# Patient Record
Sex: MALE | Race: WHITE | NOT HISPANIC OR LATINO | Employment: FULL TIME | URBAN - METROPOLITAN AREA
[De-identification: names, ages, dates, MRNs, and addresses within clinical notes are randomized per-mention and may not be internally consistent; named-entity substitution may affect disease eponyms.]

---

## 2017-03-22 ENCOUNTER — TELEPHONE (OUTPATIENT)
Dept: INTERNAL MEDICINE | Facility: CLINIC | Age: 52
End: 2017-03-22

## 2017-03-22 RX ORDER — OSELTAMIVIR PHOSPHATE 75 MG/1
75 CAPSULE ORAL DAILY
Qty: 10 CAPSULE | Refills: 0 | Status: SHIPPED | OUTPATIENT
Start: 2017-03-22 | End: 2017-04-01

## 2017-03-22 NOTE — TELEPHONE ENCOUNTER
----- Message from Camilla Mattson MA sent at 3/22/2017  8:36 AM EDT -----  Pt wife was diagnosed with flu. Patient wants to know if Tamiflu can be Rx.  [facility]  Contact patient [phone]

## 2017-09-01 ENCOUNTER — HOSPITAL ENCOUNTER (EMERGENCY)
Facility: HOSPITAL | Age: 52
Discharge: HOME | End: 2017-09-01
Attending: EMERGENCY MEDICINE | Admitting: EMERGENCY MEDICINE

## 2017-09-01 ENCOUNTER — APPOINTMENT (OUTPATIENT)
Dept: CT IMAGING | Facility: HOSPITAL | Age: 52
End: 2017-09-01

## 2017-09-01 VITALS
DIASTOLIC BLOOD PRESSURE: 58 MMHG | RESPIRATION RATE: 20 BRPM | HEIGHT: 69 IN | OXYGEN SATURATION: 99 % | BODY MASS INDEX: 30.96 KG/M2 | WEIGHT: 209 LBS | SYSTOLIC BLOOD PRESSURE: 123 MMHG | HEART RATE: 92 BPM | TEMPERATURE: 98.1 F
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
## 2017-09-01 DIAGNOSIS — S22.080A COMPRESSION FRACTURE OF T12 VERTEBRA, INITIAL ENCOUNTER (HCC): ICD-10-CM

## 2017-09-01 PROCEDURE — 25010000002 DEXAMETHASONE SOD PHOS-NACL 10-0.9 MG/50ML-% SOLUTION: Performed by: EMERGENCY MEDICINE

## 2017-09-01 PROCEDURE — 96374 THER/PROPH/DIAG INJ IV PUSH: CPT

## 2017-09-01 PROCEDURE — 96375 TX/PRO/DX INJ NEW DRUG ADDON: CPT

## 2017-09-01 PROCEDURE — 72131 CT LUMBAR SPINE W/O DYE: CPT

## 2017-09-01 PROCEDURE — 72128 CT CHEST SPINE W/O DYE: CPT

## 2017-09-01 PROCEDURE — 25010000002 HYDROMORPHONE PER 4 MG: Performed by: EMERGENCY MEDICINE

## 2017-09-01 PROCEDURE — 99284 EMERGENCY DEPT VISIT MOD MDM: CPT

## 2017-09-01 PROCEDURE — 25010000002 KETOROLAC TROMETHAMINE PER 15 MG: Performed by: EMERGENCY MEDICINE

## 2017-09-01 PROCEDURE — 96376 TX/PRO/DX INJ SAME DRUG ADON: CPT

## 2017-09-01 RX ORDER — DEXAMETHASONE IN 0.9 % SOD CHL 10 MG/50ML
10 INTRAVENOUS SOLUTION, PIGGYBACK (ML) INTRAVENOUS ONCE
Status: COMPLETED | OUTPATIENT
Start: 2017-09-01 | End: 2017-09-01

## 2017-09-01 RX ORDER — HYDROCODONE BITARTRATE AND ACETAMINOPHEN 5; 325 MG/1; MG/1
TABLET ORAL
Qty: 20 TABLET | Refills: 0 | Status: ON HOLD | OUTPATIENT
Start: 2017-09-01 | End: 2017-09-07

## 2017-09-01 RX ORDER — SODIUM CHLORIDE 0.9 % (FLUSH) 0.9 %
10 SYRINGE (ML) INJECTION AS NEEDED
Status: DISCONTINUED | OUTPATIENT
Start: 2017-09-01 | End: 2017-09-01 | Stop reason: HOSPADM

## 2017-09-01 RX ORDER — DEXAMETHASONE SODIUM PHOSPHATE 4 MG/ML
10 INJECTION, SOLUTION INTRA-ARTICULAR; INTRALESIONAL; INTRAMUSCULAR; INTRAVENOUS; SOFT TISSUE ONCE
Status: DISCONTINUED | OUTPATIENT
Start: 2017-09-01 | End: 2017-09-01 | Stop reason: SDUPTHER

## 2017-09-01 RX ORDER — KETOROLAC TROMETHAMINE 15 MG/ML
7.5 INJECTION, SOLUTION INTRAMUSCULAR; INTRAVENOUS ONCE
Status: COMPLETED | OUTPATIENT
Start: 2017-09-01 | End: 2017-09-01

## 2017-09-01 RX ORDER — HYDROMORPHONE HYDROCHLORIDE 1 MG/ML
0.5 INJECTION, SOLUTION INTRAMUSCULAR; INTRAVENOUS; SUBCUTANEOUS
Status: DISCONTINUED | OUTPATIENT
Start: 2017-09-01 | End: 2017-09-01 | Stop reason: HOSPADM

## 2017-09-01 RX ORDER — HYDROMORPHONE HYDROCHLORIDE 1 MG/ML
0.5 INJECTION, SOLUTION INTRAMUSCULAR; INTRAVENOUS; SUBCUTANEOUS ONCE
Status: COMPLETED | OUTPATIENT
Start: 2017-09-01 | End: 2017-09-01

## 2017-09-01 RX ADMIN — KETOROLAC TROMETHAMINE 7.5 MG: 15 INJECTION, SOLUTION INTRAMUSCULAR; INTRAVENOUS at 18:26

## 2017-09-01 RX ADMIN — HYDROMORPHONE HYDROCHLORIDE 0.5 MG: 1 INJECTION, SOLUTION INTRAMUSCULAR; INTRAVENOUS; SUBCUTANEOUS at 18:58

## 2017-09-01 RX ADMIN — HYDROMORPHONE HYDROCHLORIDE 0.5 MG: 1 INJECTION, SOLUTION INTRAMUSCULAR; INTRAVENOUS; SUBCUTANEOUS at 20:37

## 2017-09-01 RX ADMIN — HYDROMORPHONE HYDROCHLORIDE 0.5 MG: 1 INJECTION, SOLUTION INTRAMUSCULAR; INTRAVENOUS; SUBCUTANEOUS at 18:27

## 2017-09-01 RX ADMIN — Medication 10 MG: at 20:39
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
## 2017-09-02 NOTE — DISCHARGE INSTRUCTIONS
Avoid bending or stooping.  Should either try to lay down or stand up.          CONTROLLED SUBSTANCE(S) EDUCATION  Controlled Substances have been prescribed by your provider to treat your medical condition and associated symptoms. Although Controlled Substances can be effective in relieving your pain or other symptoms, they may also cause serious adverse effects. It is important that you understand how to safely and appropriately take these medications.  Proper Use  1. Carefully following instructions for use, including timing of doses, whether to take the  medication with or without food, and any foods or other medications to avoid while taking the medication;  2. If you have low or impaired vision you should wear glasses when taking the medication and not take the medication in the dark;  3. You should read the prescription container label each time to confirm the dosage;  4. You should never use the medication after the expiration date;  5. You must never share the medication with others;  6. You must not take the medication with alcohol or other sedatives;  7. You should not take the medication to help you sleep;  8. You should never break, crush or chew the medication;  9. If you have been prescribed a skin patch (transdermal), external heat, fever and exertion can increase the absorption of these products, leading to potentially fatal overdose;  10. You should immediately contact the physician?s office to report any adverse reaction and,  11. It is illegal to share, sell or give away Controlled Substances.  Driving and Work Safety  1. Controlled Substances may cause sleepiness, clouded thinking, decreased concentration, slower reflexes, or incoordination, all of which may create a danger to you and others when driving or operating certain type of machinery;  2. Avoid, if possible, driving or engaging in other potentially dangerous work or other activities, for a specific period of time until the initial  effects of the Controlled Substances no longer create such dangers; and,  3. Ingesting other substances, such as alcohol, benzodiazepines or some cold remedies, at the same time you are taking the Controlled Substances prescribed or dispensed may increase cognitive and motor impairment.  Pregnancy  If you are pregnant or nursing a baby, avoid using Controlled Substances, or use them on a minimal basis in strict accordance with your provider?s instructions.  Potential for Overdose and Response  1. The use of Controlled Substances creates a risk of respiratory depression, which may result in serious harm or death. You and others should be watchful for the following warning signs of overmedication:  ? intoxicated behavior, such as confusion, slurred speech, or stumbling;  ? feeling dizzy or faint;  ? acting very drowsy or groggy;  ? unusual snoring, gasping, or snorting during sleep;  ? and/or difficulty waking up from sleep or difficulty in staying awake.  2. Immediately call ?911? or an emergency service upon you or your caregivers observing or experiencing any of the following conditions:  ? you cannot be aroused or waken, or are unable to talk after being awakened;  ? you have shortness of breath, slow or light breathing, or stopped breathing;  ? gurgling noises coming from your mouth or throat;  ? your body is limp, seems lifeless;  ? your face is pale or clammy;  ? your fingernails or lips are turning purple or blue; and/or  ? your heartbeat is slow, unusual or stopped  Safe Storage of Controlled Substances  1. If your Controlled Substances are not stored in a safe manner there is a potential that  partners, family members or others may improperly obtain your Controlled Substances;  2. Always keep the Controlled Substances in the original container;  3. Store Controlled Substances in a locked cabinet or other secure storage unit, that is cool, dry and out of direct sunlight, such as:  ? an existing safe;  ? a  cut-proof travel bag;  ? a portable lock box designed for travel; or,  ? a locking medical box.  4. Do not store Controlled Substances in:  ? an unlocked medicine cabinet;  ? in your car; or,  ? in a refrigerator or freezer unless specifically recommended by the prescriber or  pharmacist; and  5. Immediately notify your provider if any Controlled Substances prescribed or dispensed by the provider are stolen or improperly taken by another individual.  Proper Disposal  1. It is important to safely and appropriately dispose of unused Controlled Substances that had been prescribed or dispensed by your provider;  2. Promptly dispose of unused Controlled Substances after the expiration date of the  prescription or after you no longer require the Controlled Substances to treat your medical condition;  3. In order to safely dispose of Controlled Substances, you should turn in the unused Controlled Substances as part of an approved governmental drug take-back program. The [place] Office of Drug Control Policy has a listing of [place] Permanent Drug Disposal Locations at http://www.odcp.[place].gov - click on the [place] Prescriptions Drug Drop Map and Location on the left side of the page.  4. You should not flush Controlled Substances down the toilet; and,  5. You should personally remove any identifying information, including the prescription number, from an empty Controlled Substance container and then properly dispose of the empty container.  CONSENT FOR TREATMENT WITH CONTROLLED SUBSTANCE(S)  (This is for an initial prescription)  1. Controlled Substances  Controlled Substances are prescribed to treat a variety of conditions, including the relief  of chronic pain, to provide stimulation, promote weight loss, and treat mood disorders.  Pain relief is an important medical reason to take Controlled Substances.  Controlled Substances are drugs or chemical substances whose possession and use are  regulated under the Controlled  Substances Act. The law requires that patient are informed of the risks, benefits, and alternatives of taking Controlled Substances.  2. Adverse Effects  As with any medication, there are risks and adverse effects associated with the use of  Controlled Substances. Common adverse effects of pain medicines could include, but are not limited to: sedation or sleepiness, nausea, vomiting, constipation, pruritus (itching), confusion, respiratory depression, and urinary retention. Some of these effects may make it unsafe for you to drive a vehicle, operate heavy machinery, or perform other tasks that require concentration and coordination. Excessive use of these Controlled Substances can lead to profound sedation, respiratory depression, coma, and/or death. Regarding stimulants, adverse effects could include, but are not limited to: drug dependency, neuropsychiatric symptoms such as psychosis and manuel, weight loss, cardiovascular events such as heart attack and stroke, insomnia, hypertension, and agitation. Any questions you have regarding the Controlled Substance(s) should be discussed with the prescribing provider.  3. Physical Dependence, Tolerance, and Addiction  Although uncommon when used for their clinical indications, both pain relievers and  stimulants can cause physical dependence, tolerance, and/or addiction when used for a  prolonged period. Maintenance therapy with these Controlled Substances can cause  physical dependence. This means that if these medications are abruptly stopped, or  decreased significantly over a short period of time, a patient may experience withdrawal  symptoms such as: nervousness, irritability, insomnia, sweating, abdominal cramping,  nausea, vomiting, and diarrhea. Tolerance occurs when the effects of these Controlled  Substances are decreased over a period of prolonged use making it necessary to increase the dosage. Physical dependence and tolerance are different than addiction.  Addiction is a complex disease characterized by compulsive craving or seeking and use of a substance despite its extreme negatives on a person. The risk of addiction may be increased in a patient with a history of alcoholism or other addiction.  4. Alternatives  Controlled Substances are routinely prescribed to treat moderate to severe pain or other  medical conditions. Other medicines are available to treat these conditions that are not  associated with tolerance or addiction, however, are associated with a lower level of pain  relief or stimulation. It may also be an alternative to not take any medicine to treat these  conditions, or to use alternative modalities, other than medicine to treat these conditions.  I voluntarily consent to the receipt of the above-named Controlled Substance(s) as prescribed by my provider. I have been informed of the benefits, risks, and alternatives to taking these medications. I acknowledge that I have read and understood all of the information above and I have had the opportunity to ask questions and have them answered to my satisfaction.

## 2017-09-05 ENCOUNTER — PREP FOR SURGERY (OUTPATIENT)
Dept: OTHER | Facility: HOSPITAL | Age: 52
End: 2017-09-05

## 2017-09-05 ENCOUNTER — OFFICE VISIT (OUTPATIENT)
Dept: NEUROSURGERY | Facility: CLINIC | Age: 52
End: 2017-09-05

## 2017-09-05 VITALS — BODY MASS INDEX: 31.1 KG/M2 | TEMPERATURE: 96 F | HEIGHT: 69 IN | WEIGHT: 210 LBS

## 2017-09-05 DIAGNOSIS — S22.081A: Primary | ICD-10-CM

## 2017-09-05 DIAGNOSIS — S22.081A BURST FRACTURE OF TWELFTH THORACIC VERTEBRA (HCC): Primary | ICD-10-CM

## 2017-09-05 DIAGNOSIS — M54.9 MECHANICAL BACK PAIN: ICD-10-CM

## 2017-09-05 PROCEDURE — 99244 OFF/OP CNSLTJ NEW/EST MOD 40: CPT | Performed by: NEUROLOGICAL SURGERY

## 2017-09-05 RX ORDER — HYDROCODONE BITARTRATE AND ACETAMINOPHEN 7.5; 325 MG/1; MG/1
1 TABLET ORAL 2 TIMES DAILY PRN
Qty: 60 TABLET | Refills: 0 | Status: ON HOLD | OUTPATIENT
Start: 2017-09-05 | End: 2017-09-08

## 2017-09-05 RX ORDER — CHLORHEXIDINE GLUCONATE 4 G/100ML
SOLUTION TOPICAL
Qty: 120 ML | Refills: 0 | Status: ON HOLD | OUTPATIENT
Start: 2017-09-05 | End: 2017-09-07

## 2017-09-05 RX ORDER — OXYCODONE HCL 10 MG/1
10 TABLET, FILM COATED, EXTENDED RELEASE ORAL ONCE
Status: CANCELLED | OUTPATIENT
Start: 2017-09-05 | End: 2017-09-05

## 2017-09-05 RX ORDER — ACETAMINOPHEN 500 MG
1000 TABLET ORAL ONCE
Status: CANCELLED | OUTPATIENT
Start: 2017-09-05 | End: 2017-09-05

## 2017-09-05 RX ORDER — FAMOTIDINE 20 MG/1
20 TABLET, FILM COATED ORAL
Status: CANCELLED | OUTPATIENT
Start: 2017-09-05

## 2017-09-05 RX ORDER — CEFAZOLIN SODIUM 2 G/100ML
2 INJECTION, SOLUTION INTRAVENOUS ONCE
Status: CANCELLED | OUTPATIENT
Start: 2017-09-05 | End: 2017-09-05

## 2017-09-05 RX ORDER — SODIUM CHLORIDE 0.9 % (FLUSH) 0.9 %
1-10 SYRINGE (ML) INJECTION AS NEEDED
Status: CANCELLED | OUTPATIENT
Start: 2017-09-05

## 2017-09-05 RX ORDER — IBUPROFEN 800 MG/1
800 TABLET ORAL ONCE
Status: CANCELLED | OUTPATIENT
Start: 2017-09-05 | End: 2017-09-05
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
## 2017-09-06 ENCOUNTER — APPOINTMENT (OUTPATIENT)
Dept: PREADMISSION TESTING | Facility: HOSPITAL | Age: 52
End: 2017-09-06

## 2017-09-06 VITALS — HEIGHT: 69 IN | BODY MASS INDEX: 30.96 KG/M2 | WEIGHT: 209 LBS

## 2017-09-06 DIAGNOSIS — S22.081A BURST FRACTURE OF TWELFTH THORACIC VERTEBRA (HCC): ICD-10-CM

## 2017-09-06 LAB
DEPRECATED RDW RBC AUTO: 40.2 FL (ref 37–54)
ERYTHROCYTE [DISTWIDTH] IN BLOOD BY AUTOMATED COUNT: 13 % (ref 11.3–14.5)
HCT VFR BLD AUTO: 45.6 % (ref 38.9–50.9)
HGB BLD-MCNC: 15.7 G/DL (ref 13.1–17.5)
MCH RBC QN AUTO: 29.3 PG (ref 27–31)
MCHC RBC AUTO-ENTMCNC: 34.4 G/DL (ref 32–36)
MCV RBC AUTO: 85.2 FL (ref 80–99)
MRSA DNA SPEC QL NAA+PROBE: POSITIVE
PLATELET # BLD AUTO: 213 10*3/MM3 (ref 150–450)
PMV BLD AUTO: 8.6 FL (ref 6–12)
RBC # BLD AUTO: 5.35 10*6/MM3 (ref 4.2–5.76)
WBC NRBC COR # BLD: 8.39 10*3/MM3 (ref 3.5–10.8)

## 2017-09-06 PROCEDURE — 87641 MR-STAPH DNA AMP PROBE: CPT | Performed by: NEUROLOGICAL SURGERY

## 2017-09-06 PROCEDURE — 85027 COMPLETE CBC AUTOMATED: CPT | Performed by: ANESTHESIOLOGY

## 2017-09-06 PROCEDURE — 36415 COLL VENOUS BLD VENIPUNCTURE: CPT

## 2017-09-06 PROCEDURE — 93010 ELECTROCARDIOGRAM REPORT: CPT | Performed by: INTERNAL MEDICINE

## 2017-09-06 PROCEDURE — 93005 ELECTROCARDIOGRAM TRACING: CPT

## 2017-09-07 ENCOUNTER — HOSPITAL ENCOUNTER (INPATIENT)
Facility: HOSPITAL | Age: 52
LOS: 1 days | Discharge: HOME | End: 2017-09-08
Attending: NEUROLOGICAL SURGERY | Admitting: NEUROLOGICAL SURGERY

## 2017-09-07 ENCOUNTER — ANESTHESIA (OUTPATIENT)
Dept: PERIOP | Facility: HOSPITAL | Age: 52
End: 2017-09-07

## 2017-09-07 ENCOUNTER — APPOINTMENT (OUTPATIENT)
Dept: GENERAL RADIOLOGY | Facility: HOSPITAL | Age: 52
End: 2017-09-07

## 2017-09-07 ENCOUNTER — ANESTHESIA EVENT (OUTPATIENT)
Dept: PERIOP | Facility: HOSPITAL | Age: 52
End: 2017-09-07

## 2017-09-07 DIAGNOSIS — S22.081A BURST FRACTURE OF TWELFTH THORACIC VERTEBRA (HCC): ICD-10-CM

## 2017-09-07 PROCEDURE — 25010000002 ONDANSETRON PER 1 MG: Performed by: NURSE ANESTHETIST, CERTIFIED REGISTERED

## 2017-09-07 PROCEDURE — 25010000002 DEXAMETHASONE PER 1 MG: Performed by: NURSE ANESTHETIST, CERTIFIED REGISTERED

## 2017-09-07 PROCEDURE — 94660 CPAP INITIATION&MGMT: CPT

## 2017-09-07 PROCEDURE — 25010000002 VANCOMYCIN PER 500 MG: Performed by: NEUROLOGICAL SURGERY

## 2017-09-07 PROCEDURE — C1713 ANCHOR/SCREW BN/BN,TIS/BN: HCPCS | Performed by: NEUROLOGICAL SURGERY

## 2017-09-07 PROCEDURE — 25010000002 HYDROMORPHONE PER 4 MG: Performed by: NURSE ANESTHETIST, CERTIFIED REGISTERED

## 2017-09-07 PROCEDURE — 25010000002 FENTANYL CITRATE (PF) 100 MCG/2ML SOLUTION: Performed by: NURSE ANESTHETIST, CERTIFIED REGISTERED

## 2017-09-07 PROCEDURE — 25010000002 PROPOFOL 10 MG/ML EMULSION: Performed by: NURSE ANESTHETIST, CERTIFIED REGISTERED

## 2017-09-07 PROCEDURE — 76001 HC FLUORO GREATER THAN 1 HOUR: CPT

## 2017-09-07 PROCEDURE — 22842 INSERT SPINE FIXATION DEVICE: CPT | Performed by: NEUROLOGICAL SURGERY

## 2017-09-07 PROCEDURE — 25010000002 NEOSTIGMINE 10 MG/10ML SOLUTION: Performed by: NURSE ANESTHETIST, CERTIFIED REGISTERED

## 2017-09-07 PROCEDURE — 25010000002 VANCOMYCIN: Performed by: PHYSICIAN ASSISTANT

## 2017-09-07 PROCEDURE — 22610 ARTHRD PST TQ 1NTRSPC THRC: CPT | Performed by: NEUROLOGICAL SURGERY

## 2017-09-07 PROCEDURE — 22614 ARTHRD PST TQ 1NTRSPC EA ADD: CPT | Performed by: NEUROLOGICAL SURGERY

## 2017-09-07 PROCEDURE — 0PH404Z INSERTION OF INTERNAL FIXATION DEVICE INTO THORACIC VERTEBRA, OPEN APPROACH: ICD-10-PCS | Performed by: NEUROLOGICAL SURGERY

## 2017-09-07 PROCEDURE — 76000 FLUOROSCOPY <1 HR PHYS/QHP: CPT

## 2017-09-07 PROCEDURE — 61783 SCAN PROC SPINAL: CPT | Performed by: NEUROLOGICAL SURGERY

## 2017-09-07 DEVICE — SCREW 55840016545 5.5 CNMAS 6.5X45 CC
Type: IMPLANTABLE DEVICE | Status: FUNCTIONAL
Brand: CD HORIZON® SPINAL SYSTEM

## 2017-09-07 DEVICE — SCREW 55840016540 5.5 CNMAS 6.5X40 CC
Type: IMPLANTABLE DEVICE | Status: FUNCTIONAL
Brand: CD HORIZON® SPINAL SYSTEM

## 2017-09-07 DEVICE — ROD 1555200500 5.5MM CCM LINED 500MM
Type: IMPLANTABLE DEVICE | Status: FUNCTIONAL
Brand: CD HORIZON® SPINAL SYSTEM

## 2017-09-07 DEVICE — SET SCREW 5540030 5.5 TI NS BRK OFF
Type: IMPLANTABLE DEVICE | Status: FUNCTIONAL
Brand: CD HORIZON® SPINAL SYSTEM

## 2017-09-07 RX ORDER — BISACODYL 5 MG/1
5 TABLET, DELAYED RELEASE ORAL DAILY PRN
Status: DISCONTINUED | OUTPATIENT
Start: 2017-09-07 | End: 2017-09-08 | Stop reason: HOSPADM

## 2017-09-07 RX ORDER — FENTANYL CITRATE 50 UG/ML
50 INJECTION, SOLUTION INTRAMUSCULAR; INTRAVENOUS
Status: DISCONTINUED | OUTPATIENT
Start: 2017-09-07 | End: 2017-09-07 | Stop reason: HOSPADM

## 2017-09-07 RX ORDER — MORPHINE SULFATE 4 MG/ML
4 INJECTION, SOLUTION INTRAMUSCULAR; INTRAVENOUS EVERY 4 HOURS PRN
Status: DISCONTINUED | OUTPATIENT
Start: 2017-09-07 | End: 2017-09-08 | Stop reason: HOSPADM

## 2017-09-07 RX ORDER — BISACODYL 10 MG
10 SUPPOSITORY, RECTAL RECTAL DAILY PRN
Status: DISCONTINUED | OUTPATIENT
Start: 2017-09-07 | End: 2017-09-08 | Stop reason: HOSPADM

## 2017-09-07 RX ORDER — OXYCODONE HYDROCHLORIDE AND ACETAMINOPHEN 5; 325 MG/1; MG/1
1 TABLET ORAL EVERY 6 HOURS PRN
Status: DISCONTINUED | OUTPATIENT
Start: 2017-09-07 | End: 2017-09-08 | Stop reason: HOSPADM

## 2017-09-07 RX ORDER — DIAZEPAM 5 MG/1
5 TABLET ORAL EVERY 6 HOURS
Status: DISCONTINUED | OUTPATIENT
Start: 2017-09-08 | End: 2017-09-08 | Stop reason: HOSPADM

## 2017-09-07 RX ORDER — ONDANSETRON 2 MG/ML
4 INJECTION INTRAMUSCULAR; INTRAVENOUS EVERY 6 HOURS PRN
Status: DISCONTINUED | OUTPATIENT
Start: 2017-09-07 | End: 2017-09-08 | Stop reason: HOSPADM

## 2017-09-07 RX ORDER — SODIUM CHLORIDE 0.9 % (FLUSH) 0.9 %
1-10 SYRINGE (ML) INJECTION AS NEEDED
Status: DISCONTINUED | OUTPATIENT
Start: 2017-09-07 | End: 2017-09-07 | Stop reason: HOSPADM

## 2017-09-07 RX ORDER — FENTANYL CITRATE 50 UG/ML
INJECTION, SOLUTION INTRAMUSCULAR; INTRAVENOUS AS NEEDED
Status: DISCONTINUED | OUTPATIENT
Start: 2017-09-07 | End: 2017-09-07 | Stop reason: SURG

## 2017-09-07 RX ORDER — IBUPROFEN 600 MG/1
600 TABLET ORAL EVERY 8 HOURS
Status: DISCONTINUED | OUTPATIENT
Start: 2017-09-07 | End: 2017-09-08 | Stop reason: HOSPADM

## 2017-09-07 RX ORDER — NEOSTIGMINE METHYLSULFATE 1 MG/ML
INJECTION, SOLUTION INTRAVENOUS AS NEEDED
Status: DISCONTINUED | OUTPATIENT
Start: 2017-09-07 | End: 2017-09-07 | Stop reason: SURG

## 2017-09-07 RX ORDER — NALOXONE HCL 0.4 MG/ML
0.4 VIAL (ML) INJECTION AS NEEDED
Status: DISCONTINUED | OUTPATIENT
Start: 2017-09-07 | End: 2017-09-07 | Stop reason: HOSPADM

## 2017-09-07 RX ORDER — SODIUM CHLORIDE 0.9 % (FLUSH) 0.9 %
1-10 SYRINGE (ML) INJECTION AS NEEDED
Status: DISCONTINUED | OUTPATIENT
Start: 2017-09-07 | End: 2017-09-08 | Stop reason: HOSPADM

## 2017-09-07 RX ORDER — NALOXONE HCL 0.4 MG/ML
0.4 VIAL (ML) INJECTION
Status: DISCONTINUED | OUTPATIENT
Start: 2017-09-07 | End: 2017-09-08 | Stop reason: HOSPADM

## 2017-09-07 RX ORDER — ACETAMINOPHEN 500 MG
1000 TABLET ORAL ONCE
Status: COMPLETED | OUTPATIENT
Start: 2017-09-07 | End: 2017-09-07

## 2017-09-07 RX ORDER — LABETALOL HYDROCHLORIDE 5 MG/ML
5 INJECTION, SOLUTION INTRAVENOUS
Status: DISCONTINUED | OUTPATIENT
Start: 2017-09-07 | End: 2017-09-07 | Stop reason: HOSPADM

## 2017-09-07 RX ORDER — SODIUM CHLORIDE, SODIUM LACTATE, POTASSIUM CHLORIDE, CALCIUM CHLORIDE 600; 310; 30; 20 MG/100ML; MG/100ML; MG/100ML; MG/100ML
100 INJECTION, SOLUTION INTRAVENOUS CONTINUOUS
Status: DISCONTINUED | OUTPATIENT
Start: 2017-09-07 | End: 2017-09-08

## 2017-09-07 RX ORDER — FAMOTIDINE 10 MG/ML
20 INJECTION, SOLUTION INTRAVENOUS ONCE
Status: CANCELLED | OUTPATIENT
Start: 2017-09-07 | End: 2017-09-07

## 2017-09-07 RX ORDER — HYDROCODONE BITARTRATE AND ACETAMINOPHEN 7.5; 325 MG/1; MG/1
1 TABLET ORAL EVERY 4 HOURS PRN
Status: DISCONTINUED | OUTPATIENT
Start: 2017-09-07 | End: 2017-09-08 | Stop reason: HOSPADM

## 2017-09-07 RX ORDER — ONDANSETRON 2 MG/ML
4 INJECTION INTRAMUSCULAR; INTRAVENOUS ONCE AS NEEDED
Status: DISCONTINUED | OUTPATIENT
Start: 2017-09-07 | End: 2017-09-07 | Stop reason: HOSPADM

## 2017-09-07 RX ORDER — DEXAMETHASONE SODIUM PHOSPHATE 4 MG/ML
INJECTION, SOLUTION INTRA-ARTICULAR; INTRALESIONAL; INTRAMUSCULAR; INTRAVENOUS; SOFT TISSUE AS NEEDED
Status: DISCONTINUED | OUTPATIENT
Start: 2017-09-07 | End: 2017-09-07 | Stop reason: SURG

## 2017-09-07 RX ORDER — ROCURONIUM BROMIDE 10 MG/ML
INJECTION, SOLUTION INTRAVENOUS AS NEEDED
Status: DISCONTINUED | OUTPATIENT
Start: 2017-09-07 | End: 2017-09-07 | Stop reason: SURG

## 2017-09-07 RX ORDER — MEPERIDINE HYDROCHLORIDE 25 MG/ML
12.5 INJECTION INTRAMUSCULAR; INTRAVENOUS; SUBCUTANEOUS
Status: DISCONTINUED | OUTPATIENT
Start: 2017-09-07 | End: 2017-09-07 | Stop reason: HOSPADM

## 2017-09-07 RX ORDER — PROPOFOL 10 MG/ML
VIAL (ML) INTRAVENOUS AS NEEDED
Status: DISCONTINUED | OUTPATIENT
Start: 2017-09-07 | End: 2017-09-07 | Stop reason: SURG

## 2017-09-07 RX ORDER — EPHEDRINE SULFATE 50 MG/ML
5 INJECTION, SOLUTION INTRAVENOUS ONCE AS NEEDED
Status: DISCONTINUED | OUTPATIENT
Start: 2017-09-07 | End: 2017-09-07 | Stop reason: HOSPADM

## 2017-09-07 RX ORDER — LIDOCAINE HYDROCHLORIDE 10 MG/ML
0.5 INJECTION, SOLUTION EPIDURAL; INFILTRATION; INTRACAUDAL; PERINEURAL ONCE AS NEEDED
Status: COMPLETED | OUTPATIENT
Start: 2017-09-07 | End: 2017-09-07

## 2017-09-07 RX ORDER — ONDANSETRON 2 MG/ML
INJECTION INTRAMUSCULAR; INTRAVENOUS AS NEEDED
Status: DISCONTINUED | OUTPATIENT
Start: 2017-09-07 | End: 2017-09-07 | Stop reason: SURG

## 2017-09-07 RX ORDER — DOCUSATE SODIUM 100 MG/1
100 CAPSULE, LIQUID FILLED ORAL 2 TIMES DAILY PRN
Status: DISCONTINUED | OUTPATIENT
Start: 2017-09-07 | End: 2017-09-08 | Stop reason: HOSPADM

## 2017-09-07 RX ORDER — ACETAMINOPHEN 325 MG/1
650 TABLET ORAL EVERY 4 HOURS PRN
Status: DISCONTINUED | OUTPATIENT
Start: 2017-09-07 | End: 2017-09-08 | Stop reason: HOSPADM

## 2017-09-07 RX ORDER — IBUPROFEN 800 MG/1
800 TABLET ORAL ONCE
Status: COMPLETED | OUTPATIENT
Start: 2017-09-07 | End: 2017-09-07

## 2017-09-07 RX ORDER — SODIUM CHLORIDE, SODIUM LACTATE, POTASSIUM CHLORIDE, CALCIUM CHLORIDE 600; 310; 30; 20 MG/100ML; MG/100ML; MG/100ML; MG/100ML
9 INJECTION, SOLUTION INTRAVENOUS CONTINUOUS
Status: DISCONTINUED | OUTPATIENT
Start: 2017-09-07 | End: 2017-09-07 | Stop reason: SDUPTHER

## 2017-09-07 RX ORDER — OXYCODONE HYDROCHLORIDE 15 MG/1
15 TABLET, FILM COATED, EXTENDED RELEASE ORAL EVERY 12 HOURS SCHEDULED
Status: DISCONTINUED | OUTPATIENT
Start: 2017-09-07 | End: 2017-09-08 | Stop reason: HOSPADM

## 2017-09-07 RX ORDER — MAGNESIUM HYDROXIDE 1200 MG/15ML
LIQUID ORAL AS NEEDED
Status: DISCONTINUED | OUTPATIENT
Start: 2017-09-07 | End: 2017-09-07 | Stop reason: HOSPADM

## 2017-09-07 RX ORDER — GLYCOPYRROLATE 0.2 MG/ML
INJECTION INTRAMUSCULAR; INTRAVENOUS AS NEEDED
Status: DISCONTINUED | OUTPATIENT
Start: 2017-09-07 | End: 2017-09-07 | Stop reason: SURG

## 2017-09-07 RX ORDER — SODIUM CHLORIDE, SODIUM LACTATE, POTASSIUM CHLORIDE, CALCIUM CHLORIDE 600; 310; 30; 20 MG/100ML; MG/100ML; MG/100ML; MG/100ML
90 INJECTION, SOLUTION INTRAVENOUS CONTINUOUS
Status: DISCONTINUED | OUTPATIENT
Start: 2017-09-07 | End: 2017-09-07 | Stop reason: SDUPTHER

## 2017-09-07 RX ORDER — SENNA AND DOCUSATE SODIUM 50; 8.6 MG/1; MG/1
1 TABLET, FILM COATED ORAL NIGHTLY PRN
Status: DISCONTINUED | OUTPATIENT
Start: 2017-09-07 | End: 2017-09-08 | Stop reason: HOSPADM

## 2017-09-07 RX ORDER — BUPIVACAINE HYDROCHLORIDE AND EPINEPHRINE 2.5; 5 MG/ML; UG/ML
INJECTION, SOLUTION EPIDURAL; INFILTRATION; INTRACAUDAL; PERINEURAL AS NEEDED
Status: DISCONTINUED | OUTPATIENT
Start: 2017-09-07 | End: 2017-09-07 | Stop reason: HOSPADM

## 2017-09-07 RX ORDER — OXYCODONE HCL 10 MG/1
10 TABLET, FILM COATED, EXTENDED RELEASE ORAL ONCE
Status: COMPLETED | OUTPATIENT
Start: 2017-09-07 | End: 2017-09-07

## 2017-09-07 RX ORDER — DIPHENHYDRAMINE HCL 25 MG
25 CAPSULE ORAL NIGHTLY PRN
Status: DISCONTINUED | OUTPATIENT
Start: 2017-09-07 | End: 2017-09-08 | Stop reason: HOSPADM

## 2017-09-07 RX ORDER — LIDOCAINE HYDROCHLORIDE 10 MG/ML
INJECTION, SOLUTION EPIDURAL; INFILTRATION; INTRACAUDAL; PERINEURAL AS NEEDED
Status: DISCONTINUED | OUTPATIENT
Start: 2017-09-07 | End: 2017-09-07 | Stop reason: SURG

## 2017-09-07 RX ORDER — OXYCODONE HYDROCHLORIDE AND ACETAMINOPHEN 5; 325 MG/1; MG/1
1 TABLET ORAL ONCE AS NEEDED
Status: DISCONTINUED | OUTPATIENT
Start: 2017-09-07 | End: 2017-09-07 | Stop reason: HOSPADM

## 2017-09-07 RX ORDER — FAMOTIDINE 20 MG/1
20 TABLET, FILM COATED ORAL ONCE
Status: CANCELLED | OUTPATIENT
Start: 2017-09-07 | End: 2017-09-07

## 2017-09-07 RX ORDER — FAMOTIDINE 20 MG/1
20 TABLET, FILM COATED ORAL
Status: COMPLETED | OUTPATIENT
Start: 2017-09-07 | End: 2017-09-07

## 2017-09-07 RX ORDER — MORPHINE SULFATE 2 MG/ML
2 INJECTION, SOLUTION INTRAMUSCULAR; INTRAVENOUS EVERY 4 HOURS PRN
Status: DISCONTINUED | OUTPATIENT
Start: 2017-09-07 | End: 2017-09-08 | Stop reason: HOSPADM

## 2017-09-07 RX ORDER — HYDROMORPHONE HYDROCHLORIDE 1 MG/ML
0.5 INJECTION, SOLUTION INTRAMUSCULAR; INTRAVENOUS; SUBCUTANEOUS
Status: DISCONTINUED | OUTPATIENT
Start: 2017-09-07 | End: 2017-09-07 | Stop reason: HOSPADM

## 2017-09-07 RX ADMIN — LIDOCAINE HYDROCHLORIDE 0.5 ML: 10 INJECTION, SOLUTION EPIDURAL; INFILTRATION; INTRACAUDAL; PERINEURAL at 11:05

## 2017-09-07 RX ADMIN — OXYCODONE HYDROCHLORIDE 15 MG: 15 TABLET, FILM COATED, EXTENDED RELEASE ORAL at 21:21

## 2017-09-07 RX ADMIN — IBUPROFEN 800 MG: 800 TABLET, FILM COATED ORAL at 11:05

## 2017-09-07 RX ADMIN — IBUPROFEN 600 MG: 600 TABLET, FILM COATED ORAL at 21:21

## 2017-09-07 RX ADMIN — DEXAMETHASONE SODIUM PHOSPHATE 8 MG: 4 INJECTION, SOLUTION INTRAMUSCULAR; INTRAVENOUS at 13:20

## 2017-09-07 RX ADMIN — FENTANYL CITRATE 50 MCG: 50 INJECTION, SOLUTION INTRAMUSCULAR; INTRAVENOUS at 15:42

## 2017-09-07 RX ADMIN — HYDROMORPHONE HYDROCHLORIDE 0.5 MG: 1 INJECTION, SOLUTION INTRAMUSCULAR; INTRAVENOUS; SUBCUTANEOUS at 15:20

## 2017-09-07 RX ADMIN — FENTANYL CITRATE 100 MCG: 50 INJECTION, SOLUTION INTRAMUSCULAR; INTRAVENOUS at 13:20

## 2017-09-07 RX ADMIN — DIAZEPAM 5 MG: 5 TABLET ORAL at 23:19

## 2017-09-07 RX ADMIN — ONDANSETRON 4 MG: 2 INJECTION INTRAMUSCULAR; INTRAVENOUS at 14:40

## 2017-09-07 RX ADMIN — FENTANYL CITRATE 50 MCG: 50 INJECTION, SOLUTION INTRAMUSCULAR; INTRAVENOUS at 15:58

## 2017-09-07 RX ADMIN — NEOSTIGMINE METHYLSULFATE 3 MG: 1 INJECTION, SOLUTION INTRAVENOUS at 15:00

## 2017-09-07 RX ADMIN — SODIUM CHLORIDE, POTASSIUM CHLORIDE, SODIUM LACTATE AND CALCIUM CHLORIDE: 600; 310; 30; 20 INJECTION, SOLUTION INTRAVENOUS at 14:00

## 2017-09-07 RX ADMIN — ROCURONIUM BROMIDE 50 MG: 10 INJECTION INTRAVENOUS at 13:20

## 2017-09-07 RX ADMIN — LIDOCAINE HYDROCHLORIDE 40 MG: 10 INJECTION, SOLUTION EPIDURAL; INFILTRATION; INTRACAUDAL; PERINEURAL at 13:20

## 2017-09-07 RX ADMIN — VANCOMYCIN HYDROCHLORIDE 1500 MG: 1 INJECTION, POWDER, LYOPHILIZED, FOR SOLUTION INTRAVENOUS at 12:49

## 2017-09-07 RX ADMIN — FAMOTIDINE 20 MG: 20 TABLET ORAL at 11:05

## 2017-09-07 RX ADMIN — PROPOFOL 200 MG: 10 INJECTION, EMULSION INTRAVENOUS at 13:20

## 2017-09-07 RX ADMIN — HYDROCODONE BITARTRATE AND ACETAMINOPHEN 1 TABLET: 7.5; 325 TABLET ORAL at 18:42

## 2017-09-07 RX ADMIN — SODIUM CHLORIDE, POTASSIUM CHLORIDE, SODIUM LACTATE AND CALCIUM CHLORIDE 9 ML/HR: 600; 310; 30; 20 INJECTION, SOLUTION INTRAVENOUS at 11:05

## 2017-09-07 RX ADMIN — FENTANYL CITRATE 100 MCG: 50 INJECTION, SOLUTION INTRAMUSCULAR; INTRAVENOUS at 14:55

## 2017-09-07 RX ADMIN — GLYCOPYRROLATE 0.4 MG: 0.2 INJECTION, SOLUTION INTRAMUSCULAR; INTRAVENOUS at 15:00

## 2017-09-07 RX ADMIN — HYDROMORPHONE HYDROCHLORIDE 0.5 MG: 1 INJECTION, SOLUTION INTRAMUSCULAR; INTRAVENOUS; SUBCUTANEOUS at 15:27

## 2017-09-07 RX ADMIN — ACETAMINOPHEN 1000 MG: 500 TABLET ORAL at 11:05

## 2017-09-07 RX ADMIN — OXYCODONE HYDROCHLORIDE 10 MG: 10 TABLET, FILM COATED, EXTENDED RELEASE ORAL at 11:05

## 2017-09-07 NOTE — ANESTHESIA POSTPROCEDURE EVALUATION
Patient: Vickey Dunaway    Procedure Summary     Date Anesthesia Start Anesthesia Stop Room / Location    09/07/17 1315  [facility]       Procedure Diagnosis Surgeon Provider    Nonsegmental pedicle screw fixation T11-L1 without fusion  (N/A Spine Lumbar) Burst fracture of twelfth thoracic vertebra  (Burst fracture of twelfth thoracic vertebra [S22.081A]) MD Bulmaro Cain MD          Anesthesia Type: general  Last vitals  BP   140/86 (09/07/17 1515)    Temp   98.7 °F (37.1 °C) (09/07/17 1515)    Pulse   82 (09/07/17 1515)   Resp   17 (09/07/17 1515)    SpO2   95 % (09/07/17 1515)      Post Anesthesia Care and Evaluation    Patient location during evaluation: PACU  Patient participation: complete - patient participated  Level of consciousness: awake and alert  Pain score: 4  Pain management: adequate  Airway patency: patent  Anesthetic complications: No anesthetic complications  PONV Status: none  Cardiovascular status: hemodynamically stable and acceptable  Respiratory status: nonlabored ventilation, acceptable and nasal cannula  Hydration status: acceptable
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
## 2017-09-07 NOTE — ANESTHESIA PROCEDURE NOTES
Airway  Urgency: elective    Airway not difficult    General Information and Staff    Patient location during procedure: OR  Anesthesiologist: TRACEE WILLOUGHBY  CRNA: ATIYA GARCIA    Indications and Patient Condition  Indications for airway management: airway protection    Preoxygenated: yes  MILS not maintained throughout  Mask difficulty assessment: 1 - vent by mask    Final Airway Details  Final airway type: endotracheal airway      Successful airway: ETT  Cuffed: yes   Successful intubation technique: direct laryngoscopy  Endotracheal tube insertion site: oral  Blade: Vega  Blade size: #2  ETT size: 7.5 mm  Cormack-Lehane Classification: grade I - full view of glottis  Placement verified by: chest auscultation and capnometry   Cuff volume (mL): 5  Measured from: lips  ETT to lips (cm): 22  Number of attempts at approach: 1    Additional Comments  Negative epigastric sounds, Breath sound equal bilaterally with symmetric chest rise and fall
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
## 2017-09-07 NOTE — INTERVAL H&P NOTE
H&P reviewed. The patient was examined and there are no changes to the H&P.     /87 (BP Location: Right arm, Patient Position: Lying)  Pulse 79  Temp 98 °F (36.7 °C) (Temporal Artery )   Resp 20  SpO2 96%      Heart: RRR  Lungs: CTAB    Immunizations:    Tetanus: No     Influenza: No     Pneumo: No    Labs were reviewed.       Results for LEAH ABBOTT (MRN [id number]) as of 9/7/2017 11:43   Ref. Range 9/6/2017 12:12   WBC Latest Ref Range: 3.50 - 10.80 10*3/mm3 8.39   RBC Latest Ref Range: 4.20 - 5.76 10*6/mm3 5.35   Hemoglobin Latest Ref Range: 13.1 - 17.5 g/dL 15.7   Hematocrit Latest Ref Range: 38.9 - 50.9 % 45.6   RDW Latest Ref Range: 11.3 - 14.5 % 13.0   MCV Latest Ref Range: 80.0 - 99.0 fL 85.2   MCH Latest Ref Range: 27.0 - 31.0 pg 29.3   MCHC Latest Ref Range: 32.0 - 36.0 g/dL 34.4   MPV Latest Ref Range: 6.0 - 12.0 fL 8.6   Platelets Latest Ref Range: 150 - 450 10*3/mm3 213   RDW-SD Latest Ref Range: 37.0 - 54.0 fl 40.2       EKG: NSR    Plan: Nonsegmental pedicle screw fixation T11-L1 without fusion

## 2017-09-08 VITALS
WEIGHT: 208 LBS | RESPIRATION RATE: 16 BRPM | SYSTOLIC BLOOD PRESSURE: 109 MMHG | BODY MASS INDEX: 30.81 KG/M2 | OXYGEN SATURATION: 92 % | HEIGHT: 69 IN | TEMPERATURE: 97.5 F | DIASTOLIC BLOOD PRESSURE: 70 MMHG | HEART RATE: 70 BPM

## 2017-09-08 DIAGNOSIS — Z98.1 STATUS POST LUMBAR SPINAL FUSION: Primary | ICD-10-CM

## 2017-09-08 PROCEDURE — 99024 POSTOP FOLLOW-UP VISIT: CPT | Performed by: PHYSICIAN ASSISTANT

## 2017-09-08 PROCEDURE — 25010000002 VANCOMYCIN 10 G RECONSTITUTED SOLUTION: Performed by: NEUROLOGICAL SURGERY

## 2017-09-08 RX ORDER — OXYCODONE HYDROCHLORIDE 15 MG/1
15 TABLET, FILM COATED, EXTENDED RELEASE ORAL EVERY 12 HOURS SCHEDULED
Qty: 14 TABLET | Refills: 0 | Status: SHIPPED | OUTPATIENT
Start: 2017-09-08 | End: 2017-10-03

## 2017-09-08 RX ORDER — HYDROCODONE BITARTRATE AND ACETAMINOPHEN 7.5; 325 MG/1; MG/1
1 TABLET ORAL EVERY 6 HOURS PRN
Qty: 60 TABLET | Refills: 0 | Status: SHIPPED | OUTPATIENT
Start: 2017-09-08 | End: 2019-02-20

## 2017-09-08 RX ORDER — TIZANIDINE 4 MG/1
4 TABLET ORAL EVERY 12 HOURS PRN
Status: DISCONTINUED | OUTPATIENT
Start: 2017-09-08 | End: 2017-09-08 | Stop reason: HOSPADM

## 2017-09-08 RX ORDER — IBUPROFEN 600 MG/1
600 TABLET ORAL EVERY 8 HOURS
Qty: 60 TABLET | Refills: 0 | Status: SHIPPED | OUTPATIENT
Start: 2017-09-08 | End: 2020-08-28

## 2017-09-08 RX ORDER — TIZANIDINE 4 MG/1
4 TABLET ORAL EVERY 12 HOURS PRN
Qty: 60 TABLET | Refills: 1 | Status: SHIPPED | OUTPATIENT
Start: 2017-09-08 | End: 2019-02-20

## 2017-09-08 RX ADMIN — IBUPROFEN 600 MG: 600 TABLET, FILM COATED ORAL at 14:52

## 2017-09-08 RX ADMIN — VANCOMYCIN HYDROCHLORIDE 1500 MG: 10 INJECTION, POWDER, LYOPHILIZED, FOR SOLUTION INTRAVENOUS at 00:20

## 2017-09-08 RX ADMIN — SODIUM CHLORIDE, POTASSIUM CHLORIDE, SODIUM LACTATE AND CALCIUM CHLORIDE 100 ML/HR: 600; 310; 30; 20 INJECTION, SOLUTION INTRAVENOUS at 05:43

## 2017-09-08 RX ADMIN — Medication 1 TABLET: at 04:17

## 2017-09-08 RX ADMIN — MAGNESIUM HYDROXIDE 10 ML: 2400 SUSPENSION ORAL at 16:25

## 2017-09-08 RX ADMIN — OXYCODONE HYDROCHLORIDE 15 MG: 15 TABLET, FILM COATED, EXTENDED RELEASE ORAL at 08:49

## 2017-09-08 RX ADMIN — HYDROCODONE BITARTRATE AND ACETAMINOPHEN 1 TABLET: 7.5; 325 TABLET ORAL at 14:52

## 2017-09-08 RX ADMIN — IBUPROFEN 600 MG: 600 TABLET, FILM COATED ORAL at 05:43

## 2017-09-08 RX ADMIN — DIAZEPAM 5 MG: 5 TABLET ORAL at 12:14

## 2017-09-08 RX ADMIN — OXYCODONE AND ACETAMINOPHEN 1 TABLET: 5; 325 TABLET ORAL at 04:17

## 2017-09-08 NOTE — PROGRESS NOTES
"NEUROSURGERY PROGRESS NOTE     LOS: 1 day   Patient Care Team:  Fernando Booth MD as PCP - General (Internal Medicine)  Fernando Booth MD as Referring Physician (Internal Medicine)    Chief Complaint: T12 burst fracture    POD#: 1 Day Post-Op  Procedures:  T11-L1 pedicle screw stabilization    Interval History:   The patient is ambulating and voiding independently.  Patient Complaints: Incisional pain.  This pain seems to be a bit different than the preoperative pain.  Patient Denies: Weakness, numbness, or voiding difficulty.    Vital Signs: Blood pressure 106/67, pulse 76, temperature 99.9 °F (37.7 °C), temperature source Temporal Artery , resp. rate 16, height 69\" (175.3 cm), weight 208 lb (94.3 kg), SpO2 94 %.  Intake/Output:   Intake/Output Summary (Last 24 hours) at 09/08/17 0641  Last data filed at 09/08/17 0600   Gross per 24 hour   Intake             1300 ml   Output              205 ml   Net             1095 ml     Drain output: 35/70 mL    Physical Exam:  The patient is awake, alert, and recently comfortable.  Motor and sensory function are intact in his lower extremities.  Dry dressing is in place on his incision.     Assessment/Plan:  1.  T12 burst fracture status post T11-S1 posterior stabilization.  2.  History of MRSA.  3.  Disposition: Drain out after lunch.  Home this afternoon.  The patient is to wear his TLSO whenever out of bed for sustained periods of time except when bathing.      Grayson Batista MD  09/08/17  6:41 AM    "

## 2017-09-08 NOTE — PLAN OF CARE
Problem: Patient Care Overview (Adult)  Goal: Plan of Care Review  Outcome: Ongoing (interventions implemented as appropriate)    09/08/17 5956   Patient Care Overview   Progress progress toward functional goals as expected   Coping/Psychosocial Response Interventions   Plan Of Care Reviewed With patient   Outcome Evaluation   Outcome Summary/Follow up Plan Pt appears to have slept off and on during most of the 7p shift. Pain controlled with PO meds. IVFs infusing. Ambulates to BR with gait belt and assist x 1. VSS         Problem: Perioperative Period (Adult)  Goal: Signs and Symptoms of Listed Potential Problems Will be Absent or Manageable (Perioperative Period)  Outcome: Ongoing (interventions implemented as appropriate)    Problem: Pain, Acute (Adult)  Goal: Identify Related Risk Factors and Signs and Symptoms  Outcome: Ongoing (interventions implemented as appropriate)  Goal: Acceptable Pain Control/Comfort Level  Outcome: Ongoing (interventions implemented as appropriate)

## 2017-09-08 NOTE — PROGRESS NOTES
Discharge Planning Assessment  [facility]     Patient Name: Vickey Dunaway  MRN: [id number]  Today's Date: 9/8/2017    Admit Date: 9/7/2017          Discharge Needs Assessment       09/08/17 1131    Living Environment    Lives With spouse;child(allyson), dependent    Living Arrangements house    Home Accessibility bed and bath are not on the first floor;stairs (2 railings present)    Transportation Available car;family or friend will provide    Living Environment    Quality Of Family Relationships supportive;involved;helpful    Able to Return to Prior Living Arrangements yes    Living Arrangement Comments Mr. Dunaway lives with his wife, Toyin, and 2 children, in a two story home in [place].  Mr. Dunaway stated that his brother in law and parents will also be assisting him at home as needed.    Discharge Needs Assessment    Concerns To Be Addressed no discharge needs identified    Readmission Within The Last 30 Days no previous admission in last 30 days    Equipment Currently Used at Home bipap/ cpap    Equipment Needed After Discharge none    Discharge Disposition home or self-care    Discharge Contact Information if Applicable WifeToyin,  [phone]            Discharge Plan       09/08/17 1133    Case Management/Social Work Plan    Plan Home with family assistance    Patient/Family In Agreement With Plan yes    Additional Comments Met with Mr. Dunaway at the bedside to discuss discharge planning.  Mr. Dunaway is independent with his ADL's and is ambulating independently.  No DME or home health services in the home.  He will have sufficient assistance at home from his brother in law, who is will be staying at his home through the weekend.  Mr. Hummel's parents will also be staying with him after the weekend to assist.  Mr. Dunaway is being transported home by his brother in law by car.  No discharge needs identified. CM will continue to follow.        Discharge Placement     No information found         Expected Discharge Date and Time     Expected Discharge Date Expected Discharge Time    Sep 8, 2017               Demographic Summary       09/08/17 1129    Referral Information    Admission Type inpatient    Arrived From home or self-care    Reason For Consult discharge planning    Record Reviewed clinical discipline documentation;history and physical;medical record    Contact Information    Permission Granted to Share Information With     Primary Care Physician Information    Name Fernando Hutchinson MD            Functional Status       09/08/17 1129    Functional Status Prior    Ambulation 0-->independent    Transferring 0-->independent    Toileting 0-->independent    Bathing 0-->independent    Dressing 0-->independent    Eating 0-->independent    Communication 3-->unable to understand (not related to language barrier)    Swallowing 0-->swallows foods/liquids without difficulty    IADL    Medications independent    Meal Preparation independent    Housekeeping independent    Laundry independent    Shopping independent    Activity Tolerance    Current Activity Limitations none    Usual Activity Tolerance good    Cognitive/Perceptual/Developmental    Current Mental Status/Cognitive Functioning no deficits noted    Recent Changes in Mental Status/Cognitive Functioning no changes    Employment/Financial    Employment/Finance Comments Mr. Dunaway has prescription drug coverage with [facility] insurance.  Verified insurance with patient.  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            Psychosocial     None            Abuse/Neglect     None            Legal       09/08/17 1131    Legal    Legal Comments No advance directives.            Substance Abuse     None            Patient Forms     None          Glenna Morales

## 2017-09-12 ENCOUNTER — DOCUMENTATION (OUTPATIENT)
Dept: NEUROSURGERY | Facility: CLINIC | Age: 52
End: 2017-09-12
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
## 2017-10-03 ENCOUNTER — OFFICE VISIT (OUTPATIENT)
Dept: NEUROSURGERY | Facility: CLINIC | Age: 52
End: 2017-10-03

## 2017-10-03 ENCOUNTER — HOSPITAL ENCOUNTER (OUTPATIENT)
Dept: GENERAL RADIOLOGY | Facility: HOSPITAL | Age: 52
Discharge: HOME | End: 2017-10-03
Admitting: PHYSICIAN ASSISTANT

## 2017-10-03 VITALS
SYSTOLIC BLOOD PRESSURE: 112 MMHG | WEIGHT: 202 LBS | HEART RATE: 99 BPM | TEMPERATURE: 97.6 F | BODY MASS INDEX: 29.92 KG/M2 | DIASTOLIC BLOOD PRESSURE: 70 MMHG | HEIGHT: 69 IN

## 2017-10-03 DIAGNOSIS — Z98.890 S/P SPINAL SURGERY: ICD-10-CM

## 2017-10-03 DIAGNOSIS — S22.081A BURST FRACTURE OF TWELFTH THORACIC VERTEBRA (HCC): Primary | ICD-10-CM

## 2017-10-03 DIAGNOSIS — Z98.1 STATUS POST LUMBAR SPINAL FUSION: ICD-10-CM

## 2017-10-03 PROCEDURE — 99024 POSTOP FOLLOW-UP VISIT: CPT | Performed by: PHYSICIAN ASSISTANT

## 2017-10-03 PROCEDURE — 72100 X-RAY EXAM L-S SPINE 2/3 VWS: CPT
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
## 2017-10-03 NOTE — PROGRESS NOTES
[facility]    Patient: Vickey Dunaway      Date: 10/03/17  : 1965   MRN: [id number]    PCP: Fernando Booth MD  ______________________________________________________________________    SURGERY:  ***     ***  ______________________________________________________________________    CHIEF COMPLIANT:   ***    HISTORY OF PRESENT ILLNESS  Mr. Dunaway is a pleasant 51 y.o. ***    who presents today for postop follow up.     He has undergone previous lumbar interventions***      Pre-operatively, he presented with ***     Today, he reports ***     No fevers, chills, headaches, wound drainage, B/B changes, saddle anesthesia, new pain, or weakness.    The patient's past medical history, past surgical history, family history, and social history have been reviewed at length in the electronic medical record.      Past Medical History:   Diagnosis Date   • Sleep apnea with use of continuous positive airway pressure (CPAP)      Past Surgical History:   Procedure Laterality Date   • COLONOSCOPY     • LUMBAR LAMINECTOMY WITH FUSION N/A 2017    Procedure: Nonsegmental pedicle screw fixation T11-L1 without fusion ;  Surgeon: Grayson Batista MD;  Location: [facility];  Service:    • NASAL SEPTUM SURGERY       Social History     Social History   • Marital status:      Spouse name: N/A   • Number of children: N/A   • Years of education: N/A     Occupational History   • Not on file.     Social History Main Topics   • Smoking status: Former Smoker     Types: Electronic Cigarette   • Smokeless tobacco: Never Used   • Alcohol use Yes      Comment: social   • Drug use: No   • Sexual activity: Yes     Partners: Female      Comment: spouse     Other Topics Concern   • Not on file     Social History Narrative    Lives with his wife     Mr. Dunaway has a current medication list which includes the following prescription(s): hydrocodone-acetaminophen, ibuprofen, and  "tizanidine. See EMR for details.  He has No Known Allergies.      Review of Systems   Respiratory: Positive for apnea.    Musculoskeletal: Positive for back pain.   All other systems reviewed and are negative.       PHYSICAL EXAM      Vitals: /70  Pulse 99  Temp 97.6 °F (36.4 °C)  Ht 69\" (175.3 cm)  Wt 202 lb (91.6 kg)  BMI 29.83 kg/m2   General:   WDWN, alert, and cooperative. NAD.   HEENT: NCAT   Chest: Breathing unlabored.   Neck: Normal ROM.  No masses or tenderness.    Back: Normal ROM.  No masses or tenderness.    Extremities: No edema or deformities.   Psych: Appropriate mood and affect.   Higher Fx: AAOx3. Speech intact.  Recent and remote memory intact.   Motor: Normal muscle strength, bulk and tone in upper and lower extremities.    No fasciculations, rigidity, spasticity, or abnormal movements.    Coordination: Coordination is intact.  No pronator drift.   Sensation: Sensation is intact to light touch testing throughout.   Reflexes: 2+ and symmetric in the upper and lower extremities.   Station & Gait: Station and gait are normal.   Special Test: No Ocasio's signs, there is no ankle clonus.  Straight leg raise is negative.   Antonio's sign is negative.     Skin: *** incision is well approximated at appropriate stage of healing without warmth, drainage, or erythema.      Medical Decision Making  Data Review:    *** fusion?  demonstrates intact hardware with no evidence of misalignment.  TONY reviewed at today's visit.     ASSESSMENT  No diagnosis found.    DISCUSSION  Mr. Dunaway ***  His incision is healing well with no s/s of infection.   He should continue to advance activity as tolerated, limit repetitive bending, lifting, and twisting of the lower back, and limit vigorous activities.   Reviewed imaging and clinical findings with Dr. Batista.   No Follow-up on file. He should call or RTC sooner if symptoms worsen or new symptoms develop.     There are no Patient Instructions on file for this " visit.  No orders of the defined types were placed in this encounter.    No orders of the defined types were placed in this encounter.      Time:   *** minutes with more than 50% of time spent counseling and coordination of care with patient.  Discussed impressions, prognosis, workup, risk/benefits of treatment options with patient, and risk factor reductions today. ***    JORGE Starr MD  [facility]  10/03/17

## 2017-10-03 NOTE — PATIENT INSTRUCTIONS
Ambulate frequently at home.  May sit as tolerated.  Increase activity as tolerated.    No lifting greater than 10 pounds for next few weeks.  May start to drive short distances. Advance as tolerated.  Wear his brace whenever out of bed for sustained periods of time except when bathing.    Wound Care:  Clean with mild soap and rinse at least 2 times daily.    Call our office any odorous drainage, fever, chills, or increasing pain.      Plan is to return to work in 4-6 weeks if ready at that time.  May call and will provide work note or follow up in needed.    Healthy back booklet provided today.

## 2017-10-16 ENCOUNTER — TELEPHONE (OUTPATIENT)
Dept: NEUROSURGERY | Facility: CLINIC | Age: 52
End: 2017-10-16

## 2017-10-16 NOTE — TELEPHONE ENCOUNTER
Called patient and left  about benadryl cream and to try to find out more information about work requirements that he has so I can discuss them with Georgie tomorrow.    Asked him to call us back and left the office phone number

## 2017-10-16 NOTE — TELEPHONE ENCOUNTER
Provider:  Lester  Caller: Vickey Dunaway  Time of call:   10:12  Phone #:  [phone]   Surgery:  Nonsegmental pedicle screw fixation T11-L1 without fusion   Surgery Date:  09/07/17  Last visit:   10/03/17  Next visit: 11/17/17    Reason for call:       Please see Lea's message below, pt has rash around bandage

## 2017-10-16 NOTE — TELEPHONE ENCOUNTER
Patient underwent pedicle screw fixation at T11-L1 without fusion on 9/7/17. He has been wearing a bandage over the incision and has what he described as a rash around the incision. What can he use on this? Please call to advise.    He also requested a return to work letter. I will provide that and email it to him per his request.

## 2017-11-17 ENCOUNTER — OFFICE VISIT (OUTPATIENT)
Dept: NEUROSURGERY | Facility: CLINIC | Age: 52
End: 2017-11-17

## 2017-11-17 VITALS — TEMPERATURE: 98.3 F | WEIGHT: 208 LBS | HEIGHT: 69 IN | BODY MASS INDEX: 30.81 KG/M2

## 2017-11-17 DIAGNOSIS — Z98.890 S/P SPINAL SURGERY: Primary | ICD-10-CM

## 2017-11-17 DIAGNOSIS — M54.9 MECHANICAL BACK PAIN: ICD-10-CM

## 2017-11-17 PROCEDURE — 99024 POSTOP FOLLOW-UP VISIT: CPT | Performed by: NEUROLOGICAL SURGERY
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
## 2018-01-11 ENCOUNTER — HOSPITAL ENCOUNTER (OUTPATIENT)
Dept: GENERAL RADIOLOGY | Facility: HOSPITAL | Age: 53
Discharge: HOME | End: 2018-01-11
Attending: NEUROLOGICAL SURGERY | Admitting: NEUROLOGICAL SURGERY

## 2018-01-11 DIAGNOSIS — Z98.890 S/P SPINAL SURGERY: ICD-10-CM

## 2018-01-11 PROCEDURE — 72100 X-RAY EXAM L-S SPINE 2/3 VWS: CPT

## 2018-01-17 ENCOUNTER — OFFICE VISIT (OUTPATIENT)
Dept: NEUROSURGERY | Facility: CLINIC | Age: 53
End: 2018-01-17

## 2018-01-17 VITALS — BODY MASS INDEX: 30.96 KG/M2 | WEIGHT: 209 LBS | HEIGHT: 69 IN | TEMPERATURE: 97.7 F

## 2018-01-17 DIAGNOSIS — S22.081A BURST FRACTURE OF T12 VERTEBRA (HCC): ICD-10-CM

## 2018-01-17 DIAGNOSIS — M54.9 MECHANICAL BACK PAIN: Primary | ICD-10-CM

## 2018-01-17 PROCEDURE — 99213 OFFICE O/P EST LOW 20 MIN: CPT | Performed by: NEUROLOGICAL SURGERY
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
## 2018-05-24 ENCOUNTER — HOSPITAL ENCOUNTER (OUTPATIENT)
Dept: GENERAL RADIOLOGY | Facility: HOSPITAL | Age: 53
Discharge: HOME | End: 2018-05-24
Attending: NEUROLOGICAL SURGERY | Admitting: NEUROLOGICAL SURGERY

## 2018-05-24 DIAGNOSIS — S22.081A BURST FRACTURE OF T12 VERTEBRA (HCC): ICD-10-CM

## 2018-05-24 PROCEDURE — 72100 X-RAY EXAM L-S SPINE 2/3 VWS: CPT

## 2018-06-01 ENCOUNTER — OFFICE VISIT (OUTPATIENT)
Dept: NEUROSURGERY | Facility: CLINIC | Age: 53
End: 2018-06-01

## 2018-06-01 VITALS — TEMPERATURE: 97.7 F | WEIGHT: 197 LBS | BODY MASS INDEX: 29.18 KG/M2 | HEIGHT: 69 IN

## 2018-06-01 DIAGNOSIS — S22.081A BURST FRACTURE OF T12 VERTEBRA (HCC): ICD-10-CM

## 2018-06-01 DIAGNOSIS — Z98.1 HISTORY OF LUMBAR FUSION: ICD-10-CM

## 2018-06-01 DIAGNOSIS — M54.9 MECHANICAL BACK PAIN: Primary | ICD-10-CM

## 2018-06-01 PROCEDURE — 99213 OFFICE O/P EST LOW 20 MIN: CPT | Performed by: NEUROLOGICAL SURGERY
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
## 2019-01-21 ENCOUNTER — TELEPHONE (OUTPATIENT)
Dept: INTERNAL MEDICINE | Facility: CLINIC | Age: 54
End: 2019-01-21

## 2019-01-21 DIAGNOSIS — Z20.828 EXPOSURE TO THE FLU: Primary | ICD-10-CM

## 2019-01-21 RX ORDER — OSELTAMIVIR PHOSPHATE 75 MG/1
75 CAPSULE ORAL DAILY
Qty: 10 CAPSULE | Refills: 0 | Status: SHIPPED | OUTPATIENT
Start: 2019-01-21 | End: 2019-01-31

## 2019-02-20 ENCOUNTER — OFFICE VISIT (OUTPATIENT)
Dept: INTERNAL MEDICINE | Facility: CLINIC | Age: 54
End: 2019-02-20

## 2019-02-20 VITALS
SYSTOLIC BLOOD PRESSURE: 112 MMHG | WEIGHT: 207 LBS | HEART RATE: 76 BPM | DIASTOLIC BLOOD PRESSURE: 68 MMHG | RESPIRATION RATE: 18 BRPM | TEMPERATURE: 98.9 F | BODY MASS INDEX: 31.37 KG/M2 | HEIGHT: 68 IN

## 2019-02-20 DIAGNOSIS — Z23 IMMUNIZATION DUE: ICD-10-CM

## 2019-02-20 DIAGNOSIS — K21.9 GASTROESOPHAGEAL REFLUX DISEASE WITHOUT ESOPHAGITIS: ICD-10-CM

## 2019-02-20 DIAGNOSIS — Z12.5 PROSTATE CANCER SCREENING: ICD-10-CM

## 2019-02-20 DIAGNOSIS — Z12.11 SCREEN FOR COLON CANCER: ICD-10-CM

## 2019-02-20 DIAGNOSIS — Z00.00 PHYSICAL EXAM: Primary | ICD-10-CM

## 2019-02-20 LAB
ALBUMIN SERPL-MCNC: 4.54 G/DL (ref 3.2–4.8)
ALBUMIN/GLOB SERPL: 2.2 G/DL (ref 1.5–2.5)
ALP SERPL-CCNC: 52 U/L (ref 25–100)
ALT SERPL W P-5'-P-CCNC: 41 U/L (ref 7–40)
ANION GAP SERPL CALCULATED.3IONS-SCNC: 7 MMOL/L (ref 3–11)
ARTICHOKE IGE QN: 117 MG/DL (ref 0–130)
AST SERPL-CCNC: 29 U/L (ref 0–33)
BASOPHILS # BLD AUTO: 0.08 10*3/MM3 (ref 0–0.2)
BASOPHILS NFR BLD AUTO: 1.2 % (ref 0–1)
BILIRUB BLD-MCNC: NEGATIVE MG/DL
BILIRUB SERPL-MCNC: 0.5 MG/DL (ref 0.3–1.2)
BUN BLD-MCNC: 19 MG/DL (ref 9–23)
BUN/CREAT SERPL: 16.1 (ref 7–25)
CALCIUM SPEC-SCNC: 9.4 MG/DL (ref 8.7–10.4)
CHLORIDE SERPL-SCNC: 104 MMOL/L (ref 99–109)
CHOLEST SERPL-MCNC: 190 MG/DL (ref 0–200)
CLARITY, POC: CLEAR
CO2 SERPL-SCNC: 29 MMOL/L (ref 20–31)
COLOR UR: YELLOW
CREAT BLD-MCNC: 1.18 MG/DL (ref 0.6–1.3)
DEPRECATED RDW RBC AUTO: 41.9 FL (ref 37–54)
EOSINOPHIL # BLD AUTO: 0.13 10*3/MM3 (ref 0–0.3)
EOSINOPHIL NFR BLD AUTO: 1.9 % (ref 0–3)
ERYTHROCYTE [DISTWIDTH] IN BLOOD BY AUTOMATED COUNT: 13.3 % (ref 11.3–14.5)
GFR SERPL CREATININE-BSD FRML MDRD: 65 ML/MIN/1.73
GLOBULIN UR ELPH-MCNC: 2.1 GM/DL
GLUCOSE BLD-MCNC: 100 MG/DL (ref 70–100)
GLUCOSE UR STRIP-MCNC: NEGATIVE MG/DL
HCT VFR BLD AUTO: 50.5 % (ref 38.9–50.9)
HDLC SERPL-MCNC: 40 MG/DL (ref 40–60)
HGB BLD-MCNC: 16.7 G/DL (ref 13.1–17.5)
IMM GRANULOCYTES # BLD AUTO: 0.01 10*3/MM3 (ref 0–0.05)
IMM GRANULOCYTES NFR BLD AUTO: 0.1 % (ref 0–0.6)
KETONES UR QL: NEGATIVE
LEUKOCYTE EST, POC: NEGATIVE
LYMPHOCYTES # BLD AUTO: 1.67 10*3/MM3 (ref 0.6–4.8)
LYMPHOCYTES NFR BLD AUTO: 24.7 % (ref 24–44)
MCH RBC QN AUTO: 28.8 PG (ref 27–31)
MCHC RBC AUTO-ENTMCNC: 33.1 G/DL (ref 32–36)
MCV RBC AUTO: 87.1 FL (ref 80–99)
MONOCYTES # BLD AUTO: 0.53 10*3/MM3 (ref 0–1)
MONOCYTES NFR BLD AUTO: 7.8 % (ref 0–12)
NEUTROPHILS # BLD AUTO: 4.36 10*3/MM3 (ref 1.5–8.3)
NEUTROPHILS NFR BLD AUTO: 64.4 % (ref 41–71)
NITRITE UR-MCNC: NEGATIVE MG/ML
PH UR: 7 [PH] (ref 5–8)
PLATELET # BLD AUTO: 220 10*3/MM3 (ref 150–450)
PMV BLD AUTO: 9.3 FL (ref 6–12)
POTASSIUM BLD-SCNC: 4 MMOL/L (ref 3.5–5.5)
PROT SERPL-MCNC: 6.6 G/DL (ref 5.7–8.2)
PROT UR STRIP-MCNC: NEGATIVE MG/DL
PSA SERPL-MCNC: 0.71 NG/ML (ref 0–4)
RBC # BLD AUTO: 5.8 10*6/MM3 (ref 4.2–5.76)
RBC # UR STRIP: NEGATIVE /UL
SODIUM BLD-SCNC: 140 MMOL/L (ref 132–146)
SP GR UR: 1.02 (ref 1–1.03)
TRIGL SERPL-MCNC: 264 MG/DL (ref 0–150)
TSH SERPL DL<=0.05 MIU/L-ACNC: 1.72 MIU/ML (ref 0.35–5.35)
UROBILINOGEN UR QL: NORMAL
WBC NRBC COR # BLD: 6.77 10*3/MM3 (ref 3.5–10.8)

## 2019-02-20 PROCEDURE — 80053 COMPREHEN METABOLIC PANEL: CPT | Performed by: INTERNAL MEDICINE

## 2019-02-20 PROCEDURE — 90715 TDAP VACCINE 7 YRS/> IM: CPT | Performed by: INTERNAL MEDICINE

## 2019-02-20 PROCEDURE — 99386 PREV VISIT NEW AGE 40-64: CPT | Performed by: INTERNAL MEDICINE

## 2019-02-20 PROCEDURE — 81003 URINALYSIS AUTO W/O SCOPE: CPT | Performed by: INTERNAL MEDICINE

## 2019-02-20 PROCEDURE — 90471 IMMUNIZATION ADMIN: CPT | Performed by: INTERNAL MEDICINE

## 2019-02-20 PROCEDURE — 80061 LIPID PANEL: CPT | Performed by: INTERNAL MEDICINE

## 2019-02-20 PROCEDURE — 84443 ASSAY THYROID STIM HORMONE: CPT | Performed by: INTERNAL MEDICINE

## 2019-02-20 PROCEDURE — G0103 PSA SCREENING: HCPCS | Performed by: INTERNAL MEDICINE

## 2019-02-20 PROCEDURE — 85025 COMPLETE CBC W/AUTO DIFF WBC: CPT | Performed by: INTERNAL MEDICINE

## 2019-02-20 PROCEDURE — 36415 COLL VENOUS BLD VENIPUNCTURE: CPT | Performed by: INTERNAL MEDICINE

## 2019-02-20 RX ORDER — PANTOPRAZOLE SODIUM 40 MG/1
40 TABLET, DELAYED RELEASE ORAL DAILY
Qty: 30 TABLET | Refills: 5 | Status: SHIPPED | OUTPATIENT
Start: 2019-02-20 | End: 2019-09-27 | Stop reason: SDUPTHER

## 2019-02-20 NOTE — PROGRESS NOTES
Chief Complaint   Patient presents with   • RE-ESTABLISH CARE   • Annual Exam       History of Present Illness    The patient presents for a new patient physical examination and health maintenance visit.    The patient presents for an acute visit for GERD. The patient is not on medication for his gastroesophageal reflux. He reports belching and heartburn but he denies abdominal pain, chest pain, diarrhea, dysphagia, early satiety, hoarseness, nausea, odynophagia, rectal bleeding, vomiting or weight loss. The GERD is aggravated by lying down.    Review of Systems    CONSTITUTIONAL- Denies Fever, Chills, Sweats, Fatigue, Weakness or Malaise.    NECK- Denies Decreased Range of Motion, Stiffness, Thyroid Nodules, Enlarged Lymph Nodes or Goiter.    EYES- Denies Eye Pain, Eye Drainage, Eye Redness, Eye Discharge, Decreased Vision, Visual Disturbance, Diplopia, Visual Loss or Swollen Eyelid.    HENT- Denies Nasal Discharge, Sore Throat, Ear Pain, Ear Drainage, Headache, Sinus Pain, Nasal Congestion, Decreased Hearing or Tinnitus.    PULMONARY- Denies Wheezing, Dyspnea, Sputum Production, Cough, Hemoptysis or Pleuritic Chest Pain.    GASTROINTESTINAL- Denies Constipation.    GENITOURINARY- Denies Penile Discharge, Erectile Dysfunction, Testicular Mass, Testicular Pain, Hernia, Nocturia, Decreased Urine Stream, Incomplete Voiding, Urinary Frequency, Urinary Urgency, Dysuria or Hematuria.    CARDIOVASCULAR- Denies Claudication, Edema, Syncope, Palpitations or Irregular Heart Beat.    ENDOCRINE- Denies Cold Intolerance, Depression, Hair Loss, Heat Intolerance, Memory Loss, Polydypsia, Polyphagia, Polyuria, Sleep Disturbance or Weight Gain.    NEUROLOGIC- Denies Seizures, Visual Changes, Confusion or Excessive Daytime Sleepiness.    MUSCULOSKELETAL- Denies Joint Pain, Joint Stiffness, Decreased Range of Motion, Joint Swelling or Erythema of Joints.    INTEGUMENTARY- Denies Rash, Lumps, Sores, Itching, Dryness, Color Change,  "Changes in Hair, Brittle Nails, Discolored Nails, Thickened Nails, Growths or Alopecia.    Medications      Current Outpatient Medications:   •  ibuprofen (ADVIL,MOTRIN) 600 MG tablet, Take 1 tablet by mouth Every 8 (Eight) Hours. (Patient taking differently: Take 600 mg by mouth Every 8 (Eight) Hours As Needed.), Disp: 60 tablet, Rfl: 0       Allergies    No Known Allergies    Problem List    Patient Active Problem List   Diagnosis   • Severe obstructive sleep apnea   • Burst fracture of twelfth thoracic vertebra (CMS/HCC)   • status post nonsegmental pedicle screw fixation T11-L1 without fusion        Medications, Allergies, Problems List and Past History were reviewed and updated.    Physical Examination    /68 (BP Location: Right arm, Patient Position: Sitting, Cuff Size: Adult)   Pulse 76   Temp 98.9 °F (37.2 °C) (Temporal)   Resp 18   Ht 171.5 cm (67.5\")   Wt 93.9 kg (207 lb)   BMI 31.94 kg/m²     HEENT: Head- Normocephalic Atraumatic. Facies- Within normal limits. Pinnas- Normal texture and shape bilaterally. Canals- Normal bilaterally. TMs- Normal bilaterally. Nares- Patent bilaterally. Nasal Septum- is normal. There is no tenderness to palpation over the frontal or maxillary sinuses. Lids- Normal bilaterally. Conjunctiva- Clear bilaterally. Sclera- Anicteric bilaterally. Oropharynx- Moist with no lesions. Tonsils- No enlargement, erythema or exudate.    Neck: Thyroid- non enlarged, symmetric and has no nodules. No bruits are detected. ROM- Normal Range of Motion with no rigidity.    Lungs: Auscultation- Clear to auscultation bilaterally. There are no retractions, clubbing or cyanosis. The Expiratory to Inspiratory ratio is equal.    Lymph Nodes: Cervical- no enlarged lymph nodes noted. Clavicular- Deferred. Axillary- Deferred. Inguinal- Deferred.    Cardiovascular: There are no carotid bruits. Heart- Normal Rate with Regular rhythm and no murmurs. There are no gallops. There are no rubs. In the " lower extremities there is no edema. The upper extremities do not have edema.    Abdomen: Soft, benign, non-tender with no masses, hernias, organomegaly or scars.    Male Genitourinary: The penis is circumcised with testicles found in the scrotum bilaterally. Testicular Size is normal bilaterally. The penis has no anatomic abnormalities.    Rectal: reveals normal external sphincter tone with no external lesions.    Prostate: The prostate gland is symmetric and smooth with no nodularity.    Dermatologic: The patient has no worrisome or suspicious skin lesions noted.    Impression and Assessment    Normal Physical Examination.    Encounter for Screening for Malignant Neoplasm of the Colon.    Encounter for Screening for Malignant Neoplasm of the Prostate.    Encounter for Immunization Administration.    Gastroesophageal Reflux Disease.    Plan    Gastroesophageal Reflux Disease Plan: Medication will be added as noted below.    Counseling was provided regarding: Adequate Aerobic Exercise, Dental Visits, Flossing Teeth and Heart Healthy Diet.    The following was ordered for screening and health maintenance: Cardiac Calcium CT Scan, CBC w Automated Diff, CMP, Colonoscopy, Lipid Profile, PSA, TSH and U/A.    Counseled regarding immunizations and applicable VIS given.    Immunizations Ordered and Administered: Ronny    Vickey was seen today for re-establish care and annual exam.    Diagnoses and all orders for this visit:    Physical exam  -     Comprehensive Metabolic Panel  -     Lipid Panel  -     TSH  -     CBC & Differential  -     POC Urinalysis Dipstick, Automated  -     CT Cardiac Calcium Score Without Dye; Future    Gastroesophageal reflux disease without esophagitis  -     Ambulatory referral for Screening EGD  -     pantoprazole (PROTONIX) 40 MG EC tablet; Take 1 tablet by mouth Daily.    Screen for colon cancer  -     Amb referral for Screening Colonoscopy    Prostate cancer screening  -     PSA  Screen    Immunization due  -     Tdap Vaccine Greater Than or Equal To 6yo IM        Return to Office    The patient was instructed to return for follow-up in 3 months. Next Visit: Follow-up.    The patient was instructed to return sooner if the condition changes, worsens, or does not resolve.

## 2019-02-22 RX ORDER — SODIUM, POTASSIUM,MAG SULFATES 17.5-3.13G
SOLUTION, RECONSTITUTED, ORAL ORAL
Qty: 2 BOTTLE | Refills: 0 | Status: SHIPPED | OUTPATIENT
Start: 2019-02-22 | End: 2019-02-25

## 2019-02-25 DIAGNOSIS — Z12.11 SCREENING FOR COLON CANCER: Primary | ICD-10-CM

## 2019-02-27 ENCOUNTER — HOSPITAL ENCOUNTER (OUTPATIENT)
Dept: CT IMAGING | Facility: HOSPITAL | Age: 54
Discharge: HOME | End: 2019-02-27
Admitting: INTERNAL MEDICINE

## 2019-02-27 DIAGNOSIS — Z00.00 PHYSICAL EXAM: ICD-10-CM

## 2019-02-27 PROCEDURE — 75571 CT HRT W/O DYE W/CA TEST: CPT

## 2019-02-28 ENCOUNTER — OUTSIDE FACILITY SERVICE (OUTPATIENT)
Dept: GASTROENTEROLOGY | Facility: CLINIC | Age: 54
End: 2019-02-28

## 2019-02-28 ENCOUNTER — LAB REQUISITION (OUTPATIENT)
Dept: LAB | Facility: HOSPITAL | Age: 54
End: 2019-02-28

## 2019-02-28 DIAGNOSIS — Z12.11 ENCOUNTER FOR SCREENING FOR MALIGNANT NEOPLASM OF COLON: ICD-10-CM

## 2019-02-28 PROCEDURE — 45385 COLONOSCOPY W/LESION REMOVAL: CPT | Performed by: INTERNAL MEDICINE

## 2019-02-28 PROCEDURE — 43239 EGD BIOPSY SINGLE/MULTIPLE: CPT | Performed by: INTERNAL MEDICINE

## 2019-02-28 PROCEDURE — 88305 TISSUE EXAM BY PATHOLOGIST: CPT | Performed by: INTERNAL MEDICINE

## 2019-03-01 LAB
CYTO UR: NORMAL
LAB AP CASE REPORT: NORMAL
LAB AP CLINICAL INFORMATION: NORMAL
PATH REPORT.FINAL DX SPEC: NORMAL
PATH REPORT.GROSS SPEC: NORMAL

## 2019-03-04 ENCOUNTER — TELEPHONE (OUTPATIENT)
Dept: GASTROENTEROLOGY | Facility: CLINIC | Age: 54
End: 2019-03-04

## 2019-03-04 NOTE — TELEPHONE ENCOUNTER
I called and left a message regarding the pathology for Mr. Dunaway.  He will need a repeat colonoscopy in 5 years.

## 2019-03-05 RX ORDER — ATORVASTATIN CALCIUM 20 MG/1
20 TABLET, FILM COATED ORAL DAILY
Qty: 90 TABLET | Refills: 1 | Status: SHIPPED | OUTPATIENT
Start: 2019-03-05 | End: 2019-11-03 | Stop reason: SDUPTHER

## 2019-03-12 ENCOUNTER — TELEPHONE (OUTPATIENT)
Dept: INTERNAL MEDICINE | Facility: CLINIC | Age: 54
End: 2019-03-12

## 2019-03-12 NOTE — TELEPHONE ENCOUNTER
Spoke with patient.  He has started his statin and baby aspirin daily.  Fernando Booth MD  7:21 AM  03/12/19

## 2019-03-12 NOTE — TELEPHONE ENCOUNTER
Regarding: FW: Prescription Question  Contact: [phone]      ----- Message -----  From: Vickey Dunaway  Sent: 3/5/2019   5:03 PM  To: Fernando Soliz [facility]  Subject: Prescription Question                            ----- Message from Tom, Generic sent at 3/5/2019  5:03 PM EST -----    Your assistant called about the statin drug and I wanted to discuss it with you prior to starting. When it is convenient for you please call me at [phone].  Thanks

## 2019-05-21 ENCOUNTER — OFFICE VISIT (OUTPATIENT)
Dept: INTERNAL MEDICINE | Facility: CLINIC | Age: 54
End: 2019-05-21

## 2019-05-21 VITALS
SYSTOLIC BLOOD PRESSURE: 114 MMHG | RESPIRATION RATE: 16 BRPM | TEMPERATURE: 98 F | DIASTOLIC BLOOD PRESSURE: 60 MMHG | BODY MASS INDEX: 32.1 KG/M2 | WEIGHT: 208 LBS | HEART RATE: 72 BPM

## 2019-05-21 DIAGNOSIS — K21.9 GASTROESOPHAGEAL REFLUX DISEASE WITHOUT ESOPHAGITIS: Primary | ICD-10-CM

## 2019-05-21 DIAGNOSIS — E78.5 HYPERLIPIDEMIA, UNSPECIFIED HYPERLIPIDEMIA TYPE: ICD-10-CM

## 2019-05-21 LAB
ALBUMIN SERPL-MCNC: 4.4 G/DL (ref 3.5–5.2)
ALBUMIN/GLOB SERPL: 1.8 G/DL
ALP SERPL-CCNC: 52 U/L (ref 39–117)
ALT SERPL W P-5'-P-CCNC: 29 U/L (ref 1–41)
ANION GAP SERPL CALCULATED.3IONS-SCNC: 14.7 MMOL/L
AST SERPL-CCNC: 22 U/L (ref 1–40)
BILIRUB SERPL-MCNC: 0.5 MG/DL (ref 0.2–1.2)
BUN BLD-MCNC: 19 MG/DL (ref 6–20)
BUN/CREAT SERPL: 14.2 (ref 7–25)
CALCIUM SPEC-SCNC: 9.2 MG/DL (ref 8.6–10.5)
CHLORIDE SERPL-SCNC: 104 MMOL/L (ref 98–107)
CHOLEST SERPL-MCNC: 92 MG/DL (ref 0–200)
CO2 SERPL-SCNC: 22.3 MMOL/L (ref 22–29)
CREAT BLD-MCNC: 1.34 MG/DL (ref 0.76–1.27)
GFR SERPL CREATININE-BSD FRML MDRD: 56 ML/MIN/1.73
GLOBULIN UR ELPH-MCNC: 2.5 GM/DL
GLUCOSE BLD-MCNC: 96 MG/DL (ref 65–99)
HDLC SERPL-MCNC: 42 MG/DL (ref 40–60)
LDLC SERPL CALC-MCNC: 39 MG/DL (ref 0–100)
LDLC/HDLC SERPL: 0.92 {RATIO}
POTASSIUM BLD-SCNC: 4.4 MMOL/L (ref 3.5–5.2)
PROT SERPL-MCNC: 6.9 G/DL (ref 6–8.5)
SODIUM BLD-SCNC: 141 MMOL/L (ref 136–145)
TRIGL SERPL-MCNC: 57 MG/DL (ref 0–150)
VLDLC SERPL-MCNC: 11.4 MG/DL (ref 5–40)

## 2019-05-21 PROCEDURE — 80053 COMPREHEN METABOLIC PANEL: CPT | Performed by: INTERNAL MEDICINE

## 2019-05-21 PROCEDURE — 99213 OFFICE O/P EST LOW 20 MIN: CPT | Performed by: INTERNAL MEDICINE

## 2019-05-21 PROCEDURE — 36415 COLL VENOUS BLD VENIPUNCTURE: CPT | Performed by: INTERNAL MEDICINE

## 2019-05-21 PROCEDURE — 80061 LIPID PANEL: CPT | Performed by: INTERNAL MEDICINE

## 2019-05-21 NOTE — PROGRESS NOTES
Chief Complaint   Patient presents with   • Follow-up     3 month follow up chronic medical problems       History of Present Illness    The patient presents for a follow-up related to hyperlipidemia. He is following a low fat diet. He reports that he is exercising. He is taking atorvastatin. The patient is taking his medication as prescribed. He reports no medication side effects, including muscle cramps, abdominal pain, headaches or weakness. He denies chest pain, shortness of breath, orthopnea, paroxysmal nocturnal dyspnea, dyspnea on exertion, edema, palpitations or syncope.    The patient presents for a follow-up related to GERD. The patient is on pantoprazole for his gastroesophageal reflux. The medication is taken on a regular basis and gives complete relief of the symptoms. He reports no abdominal pain, belching, diarrhea, dysphagia, early satiety, heartburn, hoarseness, nausea, odynophagia, rectal bleeding, vomiting or weight loss. The GERD has no known aggravating factors.    Review of Systems    CONSTITUTIONAL- Denies Fever, Chills, Sweats, Fatigue, Weakness or Malaise.    PULMONARY- Denies Wheezing, Sputum Production, Cough, Hemoptysis or Pleuritic Chest Pain.    CARDIOVASCULAR- Denies Claudication or Irregular Heart Beat.    Medications      Current Outpatient Medications:   •  atorvastatin (LIPITOR) 20 MG tablet, Take 1 tablet by mouth Daily., Disp: 90 tablet, Rfl: 1  •  ibuprofen (ADVIL,MOTRIN) 600 MG tablet, Take 1 tablet by mouth Every 8 (Eight) Hours. (Patient taking differently: Take 600 mg by mouth Every 8 (Eight) Hours As Needed.), Disp: 60 tablet, Rfl: 0  •  pantoprazole (PROTONIX) 40 MG EC tablet, Take 1 tablet by mouth Daily., Disp: 30 tablet, Rfl: 5  •  aspirin 81 MG tablet, Take 1 tablet by mouth Daily., Disp: 90 tablet, Rfl: 0     Allergies    No Known Allergies    Problem List    Patient Active Problem List   Diagnosis   • Severe obstructive sleep apnea   • Burst fracture of twelfth  thoracic vertebra (CMS/HCC)   • status post nonsegmental pedicle screw fixation T11-L1 without fusion        Medications, Allergies, Problems List and Past History were reviewed and updated.    Physical Examination    /60 (BP Location: Left arm, Patient Position: Sitting, Cuff Size: Adult)   Pulse 72   Temp 98 °F (36.7 °C) (Temporal)   Resp 16   Wt 94.3 kg (208 lb)   BMI 32.10 kg/m²     HEENT: Facies- Within normal limits. Lids- Normal bilaterally. Conjunctiva- Clear bilaterally. Sclera- Anicteric bilaterally.    Neck: Thyroid- non enlarged, symmetric and has no nodules. No bruits are detected.    Lungs: Auscultation- Clear to auscultation bilaterally. There are no retractions, clubbing or cyanosis. The Expiratory to Inspiratory ratio is equal.    Cardiovascular: Heart- Normal Rate with Regular rhythm and no murmurs.    Abdomen: Soft, benign, non-tender with no masses, hernias, organomegaly or scars.    Impression and Assessment    Hyperlipidemia.    Gastroesophageal Reflux Disease.    Plan    Gastroesophageal Reflux Disease Plan: The current plan was continued.    Hyperlipidemia Plan: The patient was instructed to exercise daily, eat a low fat diet and continue his medications.    Vickey was seen today for follow-up.    Diagnoses and all orders for this visit:    Gastroesophageal reflux disease without esophagitis    Hyperlipidemia, unspecified hyperlipidemia type  -     Comprehensive Metabolic Panel  -     Lipid Panel        Return to Office    The patient was instructed to return for follow-up in 6 months. Next Visit: Physical.    The patient was instructed to return sooner if the condition changes, worsens, or does not resolve.

## 2019-09-27 DIAGNOSIS — K21.9 GASTROESOPHAGEAL REFLUX DISEASE WITHOUT ESOPHAGITIS: ICD-10-CM

## 2019-09-30 RX ORDER — PANTOPRAZOLE SODIUM 40 MG/1
TABLET, DELAYED RELEASE ORAL
Qty: 30 TABLET | Refills: 5 | Status: SHIPPED | OUTPATIENT
Start: 2019-09-30 | End: 2020-06-05 | Stop reason: SDUPTHER

## 2019-11-04 RX ORDER — ATORVASTATIN CALCIUM 20 MG/1
TABLET, FILM COATED ORAL
Qty: 90 TABLET | Refills: 1 | Status: SHIPPED | OUTPATIENT
Start: 2019-11-04 | End: 2020-06-05 | Stop reason: SDUPTHER

## 2019-11-20 ENCOUNTER — OFFICE VISIT (OUTPATIENT)
Dept: INTERNAL MEDICINE | Facility: CLINIC | Age: 54
End: 2019-11-20

## 2019-11-20 VITALS
HEART RATE: 64 BPM | WEIGHT: 206 LBS | DIASTOLIC BLOOD PRESSURE: 78 MMHG | SYSTOLIC BLOOD PRESSURE: 116 MMHG | BODY MASS INDEX: 31.22 KG/M2 | HEIGHT: 68 IN | RESPIRATION RATE: 16 BRPM | TEMPERATURE: 98.5 F

## 2019-11-20 DIAGNOSIS — K21.9 GASTROESOPHAGEAL REFLUX DISEASE WITHOUT ESOPHAGITIS: ICD-10-CM

## 2019-11-20 DIAGNOSIS — E78.5 HYPERLIPIDEMIA, UNSPECIFIED HYPERLIPIDEMIA TYPE: Primary | ICD-10-CM

## 2019-11-20 LAB
ALBUMIN SERPL-MCNC: 4.4 G/DL (ref 3.5–5.2)
ALBUMIN/GLOB SERPL: 1.4 G/DL
ALP SERPL-CCNC: 54 U/L (ref 39–117)
ALT SERPL W P-5'-P-CCNC: 35 U/L (ref 1–41)
ANION GAP SERPL CALCULATED.3IONS-SCNC: 11.1 MMOL/L (ref 5–15)
AST SERPL-CCNC: 28 U/L (ref 1–40)
BILIRUB SERPL-MCNC: 0.5 MG/DL (ref 0.2–1.2)
BUN BLD-MCNC: 18 MG/DL (ref 6–20)
BUN/CREAT SERPL: 14.3 (ref 7–25)
CALCIUM SPEC-SCNC: 9.1 MG/DL (ref 8.6–10.5)
CHLORIDE SERPL-SCNC: 102 MMOL/L (ref 98–107)
CHOLEST SERPL-MCNC: 106 MG/DL (ref 0–200)
CO2 SERPL-SCNC: 24.9 MMOL/L (ref 22–29)
CREAT BLD-MCNC: 1.26 MG/DL (ref 0.76–1.27)
GFR SERPL CREATININE-BSD FRML MDRD: 60 ML/MIN/1.73
GLOBULIN UR ELPH-MCNC: 3.1 GM/DL
GLUCOSE BLD-MCNC: 103 MG/DL (ref 65–99)
HDLC SERPL-MCNC: 41 MG/DL (ref 40–60)
LDLC SERPL CALC-MCNC: 52 MG/DL (ref 0–100)
LDLC/HDLC SERPL: 1.27 {RATIO}
POTASSIUM BLD-SCNC: 4.1 MMOL/L (ref 3.5–5.2)
PROT SERPL-MCNC: 7.5 G/DL (ref 6–8.5)
SODIUM BLD-SCNC: 138 MMOL/L (ref 136–145)
TRIGL SERPL-MCNC: 64 MG/DL (ref 0–150)
VLDLC SERPL-MCNC: 12.8 MG/DL (ref 5–40)

## 2019-11-20 PROCEDURE — 80061 LIPID PANEL: CPT | Performed by: INTERNAL MEDICINE

## 2019-11-20 PROCEDURE — 99213 OFFICE O/P EST LOW 20 MIN: CPT | Performed by: INTERNAL MEDICINE

## 2019-11-20 PROCEDURE — 36415 COLL VENOUS BLD VENIPUNCTURE: CPT | Performed by: INTERNAL MEDICINE

## 2019-11-20 PROCEDURE — 80053 COMPREHEN METABOLIC PANEL: CPT | Performed by: INTERNAL MEDICINE

## 2019-11-20 NOTE — PROGRESS NOTES
Chief Complaint   Patient presents with   • Follow-up     6 month follow up chronic medical problems       History of Present Illness      The patient presents for a follow-up related to GERD. The patient is on pantoprazole for his gastroesophageal reflux. The medication is taken on a regular basis and gives complete relief of the symptoms. He reports no abdominal pain, belching, chest pain, diarrhea, dysphagia, early satiety, heartburn, hoarseness, nausea, odynophagia, rectal bleeding, vomiting or weight loss. The GERD has no known aggravating factors.    The patient presents for a follow-up related to hyperlipidemia. He is following a low fat diet. He reports that he is exercising. He is taking atorvastatin. The patient is taking his medication as prescribed. He reports no medication side effects, including muscle cramps, abdominal pain, headaches or weakness. He denies shortness of breath, orthopnea, paroxysmal nocturnal dyspnea, dyspnea on exertion, edema, palpitations or syncope.    Review of Systems    PULMONARY- Denies Wheezing, Sputum Production, Cough, Hemoptysis or Pleuritic Chest Pain.    CARDIOVASCULAR- Denies Claudication or Irregular Heart Beat.    Medications      Current Outpatient Medications:   •  atorvastatin (LIPITOR) 20 MG tablet, take 1 tablet by mouth once daily, Disp: 90 tablet, Rfl: 1  •  ibuprofen (ADVIL,MOTRIN) 600 MG tablet, Take 1 tablet by mouth Every 8 (Eight) Hours. (Patient taking differently: Take 600 mg by mouth Every 8 (Eight) Hours As Needed.), Disp: 60 tablet, Rfl: 0  •  pantoprazole (PROTONIX) 40 MG EC tablet, take 1 tablet by mouth once daily, Disp: 30 tablet, Rfl: 5  •  aspirin 81 MG tablet, Take 1 tablet by mouth Daily., Disp: 90 tablet, Rfl: 0     Allergies    No Known Allergies    Problem List    Patient Active Problem List   Diagnosis   • Severe obstructive sleep apnea   • Burst fracture of twelfth thoracic vertebra (CMS/HCC)   • status post nonsegmental pedicle screw  "fixation T11-L1 without fusion        Medications, Allergies, Problems List and Past History were reviewed and updated.    Physical Examination    /78 (BP Location: Right arm, Patient Position: Sitting, Cuff Size: Adult)   Pulse 64   Temp 98.5 °F (36.9 °C) (Temporal)   Resp 16   Ht 171.5 cm (67.5\")   Wt 93.4 kg (206 lb)   BMI 31.79 kg/m²     HEENT: Facies- Within normal limits. Lids- Normal bilaterally. Conjunctiva- Clear bilaterally. Sclera- Anicteric bilaterally.    Neck: Thyroid- non enlarged, symmetric and has no nodules. No bruits are detected.    Lungs: Auscultation- Clear to auscultation bilaterally. There are no retractions, clubbing or cyanosis. The Expiratory to Inspiratory ratio is equal.    Cardiovascular: There are no carotid bruits. Heart- Normal Rate with Regular rhythm and no murmurs. There are no gallops. There are no rubs. In the lower extremities there is no edema. The upper extremities do not have edema.    Abdomen: Soft, benign, non-tender with no masses, hernias, organomegaly or scars.    Impression and Assessment    Gastroesophageal Reflux Disease.    Hyperlipidemia.    Plan    Gastroesophageal Reflux Disease Plan: The current plan was continued.    Hyperlipidemia Plan: The patient was instructed to exercise daily, eat a low fat diet and continue his medications.    Vickey was seen today for follow-up.    Diagnoses and all orders for this visit:    Hyperlipidemia, unspecified hyperlipidemia type  -     Comprehensive Metabolic Panel  -     Lipid Panel    Gastroesophageal reflux disease without esophagitis        Return to Office    The patient was instructed to return for follow-up in 6 months. Next Visit: Physical.    The patient was instructed to return sooner if the condition changes, worsens, or does not resolve.  "

## 2020-05-27 ENCOUNTER — OFFICE VISIT (OUTPATIENT)
Dept: INTERNAL MEDICINE | Facility: CLINIC | Age: 55
End: 2020-05-27

## 2020-05-27 VITALS
OXYGEN SATURATION: 97 % | WEIGHT: 199 LBS | HEIGHT: 68 IN | RESPIRATION RATE: 18 BRPM | SYSTOLIC BLOOD PRESSURE: 110 MMHG | HEART RATE: 87 BPM | DIASTOLIC BLOOD PRESSURE: 78 MMHG | TEMPERATURE: 97.3 F | BODY MASS INDEX: 30.16 KG/M2

## 2020-05-27 DIAGNOSIS — Z00.00 PHYSICAL EXAM: Primary | ICD-10-CM

## 2020-05-27 DIAGNOSIS — Z12.5 PROSTATE CANCER SCREENING: ICD-10-CM

## 2020-05-27 DIAGNOSIS — K21.9 GASTROESOPHAGEAL REFLUX DISEASE WITHOUT ESOPHAGITIS: ICD-10-CM

## 2020-05-27 DIAGNOSIS — E78.5 HYPERLIPIDEMIA, UNSPECIFIED HYPERLIPIDEMIA TYPE: ICD-10-CM

## 2020-05-27 LAB
BILIRUB BLD-MCNC: NEGATIVE MG/DL
CLARITY, POC: CLEAR
COLOR UR: YELLOW
EXPIRATION DATE: NORMAL
GLUCOSE UR STRIP-MCNC: NEGATIVE MG/DL
KETONES UR QL: NEGATIVE
LEUKOCYTE EST, POC: NEGATIVE
Lab: NORMAL
NITRITE UR-MCNC: NEGATIVE MG/ML
PH UR: 6 [PH] (ref 5–8)
PROT UR STRIP-MCNC: NEGATIVE MG/DL
RBC # UR STRIP: NEGATIVE /UL
SP GR UR: 1.03 (ref 1–1.03)
UROBILINOGEN UR QL: NORMAL

## 2020-05-27 PROCEDURE — 36415 COLL VENOUS BLD VENIPUNCTURE: CPT | Performed by: INTERNAL MEDICINE

## 2020-05-27 PROCEDURE — 85025 COMPLETE CBC W/AUTO DIFF WBC: CPT | Performed by: INTERNAL MEDICINE

## 2020-05-27 PROCEDURE — 81003 URINALYSIS AUTO W/O SCOPE: CPT | Performed by: INTERNAL MEDICINE

## 2020-05-27 PROCEDURE — 99396 PREV VISIT EST AGE 40-64: CPT | Performed by: INTERNAL MEDICINE

## 2020-05-27 PROCEDURE — 80061 LIPID PANEL: CPT | Performed by: INTERNAL MEDICINE

## 2020-05-27 PROCEDURE — G0103 PSA SCREENING: HCPCS | Performed by: INTERNAL MEDICINE

## 2020-05-27 PROCEDURE — 80053 COMPREHEN METABOLIC PANEL: CPT | Performed by: INTERNAL MEDICINE

## 2020-05-27 PROCEDURE — 84443 ASSAY THYROID STIM HORMONE: CPT | Performed by: INTERNAL MEDICINE

## 2020-05-27 NOTE — PROGRESS NOTES
Chief Complaint   Patient presents with   • Annual Exam       History of Present Illness      The patient presents for an established patient physical examination and health maintenance visit.    The patient presents for a follow-up related to GERD. The patient is on pantoprazole for his gastroesophageal reflux. The medication is taken on a regular basis and gives complete relief of the symptoms. He reports no belching, dysphagia, early satiety, hoarseness or odynophagia. The GERD has no known aggravating factors.    The patient presents for a follow-up related to hyperlipidemia. He is following a low fat diet. He reports that he is exercising. He is taking atorvastatin. The patient is taking his medication as prescribed. He reports no medication side effects, including muscle cramps, abdominal pain, headaches or weakness. He denies orthopnea, paroxysmal nocturnal dyspnea or dyspnea on exertion.    Review of Systems    CONSTITUTIONAL- Denies Unexplained Weight Loss, Fever, Chills, Sweats, Fatigue, Weakness or Malaise.    NECK- Denies Decreased Range of Motion, Stiffness, Thyroid Nodules, Enlarged Lymph Nodes or Goiter.    EYES- Denies Eye Pain, Eye Drainage, Eye Redness, Eye Discharge, Decreased Vision, Visual Disturbance, Diplopia, Visual Loss or Swollen Eyelid.    HENT- Denies Nasal Discharge, Sore Throat, Ear Pain, Ear Drainage, Headache, Sinus Pain, Nasal Congestion, Decreased Hearing or Tinnitus.    PULMONARY- Denies Wheezing, Dyspnea, Sputum Production, Cough, Hemoptysis or Pleuritic Chest Pain.    GASTROINTESTINAL- Denies Abdominal Pain, Nausea, Vomiting, Diarrhea, Blood per Rectum, Constipation or Heartburn.    GENITOURINARY- Denies Penile Discharge, Erectile Dysfunction, Testicular Mass, Testicular Pain, Hernia, Nocturia, Decreased Urine Stream, Incomplete Voiding, Urinary Frequency, Urinary Urgency, Dysuria or Hematuria.    CARDIOVASCULAR- Denies Chest Pain, Claudication, Edema, Syncope, Palpitations or  "Irregular Heart Beat.    ENDOCRINE- Denies Cold Intolerance, Depression, Hair Loss, Heat Intolerance, Memory Loss, Polydypsia, Polyphagia, Polyuria, Sleep Disturbance or Weight Gain.    NEUROLOGIC- Denies Seizures, Visual Changes, Confusion or Excessive Daytime Sleepiness.    MUSCULOSKELETAL- Denies Joint Pain, Joint Stiffness, Decreased Range of Motion, Joint Swelling or Erythema of Joints.    INTEGUMENTARY- Denies Rash, Lumps, Sores, Itching, Dryness, Color Change, Changes in Hair, Brittle Nails, Discolored Nails, Thickened Nails, Growths or Alopecia.    Medications      Current Outpatient Medications:   •  atorvastatin (LIPITOR) 20 MG tablet, take 1 tablet by mouth once daily, Disp: 90 tablet, Rfl: 1  •  ibuprofen (ADVIL,MOTRIN) 600 MG tablet, Take 1 tablet by mouth Every 8 (Eight) Hours. (Patient taking differently: Take 600 mg by mouth Every 8 (Eight) Hours As Needed.), Disp: 60 tablet, Rfl: 0  •  pantoprazole (PROTONIX) 40 MG EC tablet, take 1 tablet by mouth once daily, Disp: 30 tablet, Rfl: 5  •  aspirin 81 MG tablet, Take 1 tablet by mouth Daily., Disp: 90 tablet, Rfl: 0     Allergies    No Known Allergies    Problem List    Patient Active Problem List   Diagnosis   • Severe obstructive sleep apnea   • Burst fracture of twelfth thoracic vertebra (CMS/HCC)   • status post nonsegmental pedicle screw fixation T11-L1 without fusion        Medications, Allergies, Problems List and Past History were reviewed and updated.    Physical Examination    /78 (BP Location: Right arm, Patient Position: Sitting, Cuff Size: Adult)   Pulse 87   Temp 97.3 °F (36.3 °C) (Temporal)   Resp 18   Ht 171.5 cm (67.5\")   Wt 90.3 kg (199 lb)   SpO2 97%   BMI 30.71 kg/m²     HEENT: Head- Normocephalic Atraumatic. Facies- Within normal limits. Pinnas- Normal texture and shape bilaterally. Canals- Normal bilaterally. TMs- Normal bilaterally. Nares- Patent bilaterally. Nasal Septum- is normal. There is no tenderness to " palpation over the frontal or maxillary sinuses. Lids- Normal bilaterally. Conjunctiva- Clear bilaterally. Sclera- Anicteric bilaterally. Oropharynx- Moist with no lesions. Tonsils- No enlargement, erythema or exudate.    Neck: Thyroid- non enlarged, symmetric and has no nodules. No bruits are detected. ROM- Normal Range of Motion with no rigidity.    Lungs: Auscultation- Clear to auscultation bilaterally. There are no retractions, clubbing or cyanosis. The Expiratory to Inspiratory ratio is equal.    Lymph Nodes: Cervical- no enlarged lymph nodes noted. Clavicular- Deferred. Axillary- Deferred. Inguinal- Deferred.    Cardiovascular: There are no carotid bruits. Heart- Normal Rate with Regular rhythm and no murmurs. There are no gallops. There are no rubs. In the lower extremities there is no edema. The upper extremities do not have edema.    Abdomen: Soft, benign, non-tender with no masses, hernias, organomegaly or scars.    Male Genitourinary: The penis is circumcised with testicles found in the scrotum bilaterally. Testicular Size is normal bilaterally. The penis has no anatomic abnormalities.    Rectal: reveals normal external sphincter tone with no external lesions.    Prostate: The prostate gland is symmetric and smooth with no nodularity.    Dermatologic: The patient has no worrisome or suspicious skin lesions noted.    Impression and Assessment    Normal Physical Examination.    Encounter for Screening for Malignant Neoplasm of the Prostate.    Gastroesophageal Reflux Disease.    Hyperlipidemia.    Plan    Gastroesophageal Reflux Disease Plan: The current plan was continued.    Hyperlipidemia Plan: The patient was instructed to exercise daily, eat a low fat diet and continue his medications.    Counseling was provided regarding: Adequate Aerobic Exercise, Dental Visits, Flossing Teeth and Heart Healthy Diet.    The following was ordered for screening and health maintenance: CBC w Automated Diff, CMP, Lipid  Profile, PSA, TSH and U/A.       Immunizations Ordered and Administered: None.    Vickey was seen today for annual exam.    Diagnoses and all orders for this visit:    Physical exam  -     Comprehensive Metabolic Panel  -     Lipid Panel  -     TSH  -     CBC & Differential  -     POC Urinalysis Dipstick, Automated    Hyperlipidemia, unspecified hyperlipidemia type  -     Comprehensive Metabolic Panel  -     Lipid Panel    Gastroesophageal reflux disease without esophagitis    Prostate cancer screening  -     PSA Screen          Return to Office    The patient was instructed to return for follow-up in 6 months. Next Visit: Follow-up.    The patient was instructed to return sooner if the condition changes, worsens, or does not resolve.

## 2020-05-28 LAB
ALBUMIN SERPL-MCNC: 4.6 G/DL (ref 3.5–5.2)
ALBUMIN/GLOB SERPL: 1.9 G/DL
ALP SERPL-CCNC: 45 U/L (ref 39–117)
ALT SERPL W P-5'-P-CCNC: 26 U/L (ref 1–41)
ANION GAP SERPL CALCULATED.3IONS-SCNC: 10.5 MMOL/L (ref 5–15)
AST SERPL-CCNC: 23 U/L (ref 1–40)
BASOPHILS # BLD AUTO: 0.09 10*3/MM3 (ref 0–0.2)
BASOPHILS NFR BLD AUTO: 1.1 % (ref 0–1.5)
BILIRUB SERPL-MCNC: 0.4 MG/DL (ref 0.2–1.2)
BUN BLD-MCNC: 21 MG/DL (ref 6–20)
BUN/CREAT SERPL: 19.1 (ref 7–25)
CALCIUM SPEC-SCNC: 9.4 MG/DL (ref 8.6–10.5)
CHLORIDE SERPL-SCNC: 101 MMOL/L (ref 98–107)
CHOLEST SERPL-MCNC: 110 MG/DL (ref 0–200)
CO2 SERPL-SCNC: 26.5 MMOL/L (ref 22–29)
CREAT BLD-MCNC: 1.1 MG/DL (ref 0.76–1.27)
DEPRECATED RDW RBC AUTO: 40.4 FL (ref 37–54)
EOSINOPHIL # BLD AUTO: 0.1 10*3/MM3 (ref 0–0.4)
EOSINOPHIL NFR BLD AUTO: 1.2 % (ref 0.3–6.2)
ERYTHROCYTE [DISTWIDTH] IN BLOOD BY AUTOMATED COUNT: 12.7 % (ref 12.3–15.4)
GFR SERPL CREATININE-BSD FRML MDRD: 70 ML/MIN/1.73
GLOBULIN UR ELPH-MCNC: 2.4 GM/DL
GLUCOSE BLD-MCNC: 95 MG/DL (ref 65–99)
HCT VFR BLD AUTO: 47.2 % (ref 37.5–51)
HDLC SERPL-MCNC: 41 MG/DL (ref 40–60)
HGB BLD-MCNC: 16 G/DL (ref 13–17.7)
IMM GRANULOCYTES # BLD AUTO: 0.02 10*3/MM3 (ref 0–0.05)
IMM GRANULOCYTES NFR BLD AUTO: 0.2 % (ref 0–0.5)
LDLC SERPL CALC-MCNC: 42 MG/DL (ref 0–100)
LDLC/HDLC SERPL: 1.01 {RATIO}
LYMPHOCYTES # BLD AUTO: 1.49 10*3/MM3 (ref 0.7–3.1)
LYMPHOCYTES NFR BLD AUTO: 17.8 % (ref 19.6–45.3)
MCH RBC QN AUTO: 29.4 PG (ref 26.6–33)
MCHC RBC AUTO-ENTMCNC: 33.9 G/DL (ref 31.5–35.7)
MCV RBC AUTO: 86.6 FL (ref 79–97)
MONOCYTES # BLD AUTO: 0.53 10*3/MM3 (ref 0.1–0.9)
MONOCYTES NFR BLD AUTO: 6.3 % (ref 5–12)
NEUTROPHILS # BLD AUTO: 6.12 10*3/MM3 (ref 1.7–7)
NEUTROPHILS NFR BLD AUTO: 73.4 % (ref 42.7–76)
NRBC BLD AUTO-RTO: 0 /100 WBC (ref 0–0.2)
PLATELET # BLD AUTO: 257 10*3/MM3 (ref 140–450)
PMV BLD AUTO: 9.2 FL (ref 6–12)
POTASSIUM BLD-SCNC: 4.1 MMOL/L (ref 3.5–5.2)
PROT SERPL-MCNC: 7 G/DL (ref 6–8.5)
PSA SERPL-MCNC: 0.48 NG/ML (ref 0–4)
RBC # BLD AUTO: 5.45 10*6/MM3 (ref 4.14–5.8)
SODIUM BLD-SCNC: 138 MMOL/L (ref 136–145)
TRIGL SERPL-MCNC: 137 MG/DL (ref 0–150)
TSH SERPL DL<=0.05 MIU/L-ACNC: 1.63 UIU/ML (ref 0.27–4.2)
VLDLC SERPL-MCNC: 27.4 MG/DL (ref 5–40)
WBC NRBC COR # BLD: 8.35 10*3/MM3 (ref 3.4–10.8)

## 2020-06-05 DIAGNOSIS — K21.9 GASTROESOPHAGEAL REFLUX DISEASE WITHOUT ESOPHAGITIS: ICD-10-CM

## 2020-06-07 RX ORDER — ATORVASTATIN CALCIUM 20 MG/1
20 TABLET, FILM COATED ORAL DAILY
Qty: 90 TABLET | Refills: 1 | Status: SHIPPED | OUTPATIENT
Start: 2020-06-07 | End: 2021-02-23

## 2020-06-07 RX ORDER — PANTOPRAZOLE SODIUM 40 MG/1
40 TABLET, DELAYED RELEASE ORAL DAILY
Qty: 90 TABLET | Refills: 1 | Status: SHIPPED | OUTPATIENT
Start: 2020-06-07 | End: 2020-06-09

## 2020-06-09 DIAGNOSIS — K21.9 GASTROESOPHAGEAL REFLUX DISEASE WITHOUT ESOPHAGITIS: ICD-10-CM

## 2020-06-09 RX ORDER — PANTOPRAZOLE SODIUM 40 MG/1
TABLET, DELAYED RELEASE ORAL
Qty: 30 TABLET | Refills: 2 | Status: SHIPPED | OUTPATIENT
Start: 2020-06-09

## 2020-08-07 ENCOUNTER — TELEPHONE (OUTPATIENT)
Dept: INTERNAL MEDICINE | Facility: CLINIC | Age: 55
End: 2020-08-07

## 2020-08-07 DIAGNOSIS — G47.33 SEVERE OBSTRUCTIVE SLEEP APNEA: Primary | ICD-10-CM

## 2020-08-07 NOTE — TELEPHONE ENCOUNTER
PT CALLED AND SAID HE NEEDS A REFERRAL TO PULMONARY AT [facility] FOR SLEEP APNEA; HE SAID HE WAS A PREVIOUS PATIENT THERE BUT SINCE ITS BEEN A WHILE, HE HAS TO BE TREATED AS A NEW PATIENT; PLEASE ADVISE    LEAH: [phone]

## 2020-08-28 ENCOUNTER — CONSULT (OUTPATIENT)
Dept: SLEEP MEDICINE | Facility: HOSPITAL | Age: 55
End: 2020-08-28

## 2020-08-28 VITALS
OXYGEN SATURATION: 98 % | DIASTOLIC BLOOD PRESSURE: 55 MMHG | HEART RATE: 72 BPM | BODY MASS INDEX: 30.34 KG/M2 | SYSTOLIC BLOOD PRESSURE: 123 MMHG | HEIGHT: 68 IN | RESPIRATION RATE: 16 BRPM | WEIGHT: 200.2 LBS | TEMPERATURE: 97.1 F

## 2020-08-28 DIAGNOSIS — G47.33 OSA (OBSTRUCTIVE SLEEP APNEA): Primary | ICD-10-CM

## 2020-08-28 PROCEDURE — 99213 OFFICE O/P EST LOW 20 MIN: CPT | Performed by: NURSE PRACTITIONER

## 2020-08-28 NOTE — PROGRESS NOTES
Chief Complaint:   Chief Complaint   Patient presents with   • Sleeping Problem     Consult, need new equipment        HPI:    Vickey Dunaway is a 54 y.o. male here for follow-up of sleep apnea.  Patient was last seen 9/26/2016.  Patient has 29 out of the last 90 days of use.  Patient states he has been using the same mask and tubing.  Approximately 1 year and this is why his use is showing low as it has been broken and he has not been able to use.  When patient uses BiPAP he does do very well.  He will sleep 6 to 8 hours nightly and will feel rested upon awakening.  Patient will go to sleep within 10 minutes and does not get up during the night.  Patient has an [place] score of 7/24.  Patient is eager to restart BiPAP, will get his order sent in for him today.    Past Medical History:   Diagnosis Date   • GERD (gastroesophageal reflux disease)    • Sleep apnea with use of continuous positive airway pressure (CPAP)      Past Surgical History:   Procedure Laterality Date   • COLONOSCOPY     • LUMBAR LAMINECTOMY WITH FUSION N/A 9/7/2017    Procedure: Nonsegmental pedicle screw fixation T11-L1 without fusion ;  Surgeon: Grayson Batista MD;  [facility];  Service:    • NASAL SEPTUM SURGERY       Social History     Social History Narrative    Lives with his wife         Current medications are:   Current Outpatient Medications:   •  atorvastatin (LIPITOR) 20 MG tablet, Take 1 tablet by mouth Daily., Disp: 90 tablet, Rfl: 1  •  pantoprazole (PROTONIX) 40 MG EC tablet, TAKE 1 TABLET BY MOUTH ONCE DAILY, Disp: 30 tablet, Rfl: 2.      The patient's relevant past medical, surgical, family and social history were reviewed and updated in Epic as appropriate.       Review of Systems   Respiratory: Positive for apnea.    Gastrointestinal:        Heartburn   Psychiatric/Behavioral: Positive for sleep disturbance.   All other systems reviewed and are negative.        Objective:    Physical Exam   Constitutional:  He is oriented to person, place, and time. He appears well-developed and well-nourished.   HENT:   Head: Normocephalic and atraumatic.   Eyes: Conjunctivae are normal.   Neck: Neck supple. No tracheal deviation present.   Pulmonary/Chest: Effort normal.   Neurological: He is alert and oriented to person, place, and time.   Skin: Skin is warm and dry.   Psychiatric: He has a normal mood and affect. His behavior is normal. Judgment and thought content normal.     29/90 days of use.  Greater than 4-hour use 27.890% IPAP 11.390% EPAP 8.7 AHI of 0.8.    ASSESSMENT/PLAN    Vickey was seen today for sleeping problem.    Diagnoses and all orders for this visit:    HAWA (obstructive sleep apnea)  -     CPAP Therapy            1. Counseled patient regarding multimodal approach with healthy nutrition, healthy sleep, regular physical activity, social activities, counseling, and medications. Encouraged to practice monthly sleep position. Avoid alcohol and sedatives close to bedtime.  2. Refill supplies x1 year.  I will see patient back in 6 weeks to reassess compliance.    I have reviewed the results of my evaluation and impression and discussed my recommendations in detail with the patient.      Signed by  MAGALIE Castaneda    August 28, 2020      CC: Fernando Booth MD          No ref. provider found

## 2020-10-14 ENCOUNTER — TELEMEDICINE (OUTPATIENT)
Dept: SLEEP MEDICINE | Facility: HOSPITAL | Age: 55
End: 2020-10-14

## 2020-10-14 VITALS — WEIGHT: 192 LBS | HEIGHT: 68 IN | BODY MASS INDEX: 29.1 KG/M2

## 2020-10-14 DIAGNOSIS — G47.33 OSA (OBSTRUCTIVE SLEEP APNEA): Primary | ICD-10-CM

## 2020-10-14 PROCEDURE — 99212 OFFICE O/P EST SF 10 MIN: CPT | Performed by: NURSE PRACTITIONER

## 2020-10-14 NOTE — PROGRESS NOTES
Chief Complaint:   Chief Complaint   Patient presents with   • Follow-up       HPI:    Vickey Dunaway is a 54 y.o. male here for follow-up of sleep apnea.  Patient was last seen 8/28/2020.  Patient does use BiPAP but was not compliant.  Patient stated he did not have an order and was having difficulty getting new supplies.  We did provide him with a new order and states he is doing better at this time.  Patient is sleeping 7 hours nightly and feels rested upon awakening.  Patient will go to sleep within 15 minutes and does not get up during the night.  Patient has an [place] score of 7/24.  Patient states he is doing better although he does know he must increase compliance.  He has no concerns or complaints regarding CPAP and wishes to continue.        Current medications are:   Current Outpatient Medications:   •  atorvastatin (LIPITOR) 20 MG tablet, Take 1 tablet by mouth Daily., Disp: 90 tablet, Rfl: 1  •  pantoprazole (PROTONIX) 40 MG EC tablet, TAKE 1 TABLET BY MOUTH ONCE DAILY, Disp: 30 tablet, Rfl: 2.      The patient's relevant past medical, surgical, family and social history were reviewed and updated in Epic as appropriate.       Review of Systems   Respiratory: Positive for apnea.    Gastrointestinal:        Heartburn   Psychiatric/Behavioral: Positive for sleep disturbance.   All other systems reviewed and are negative.        Objective:    Physical Exam  Constitutional:       Appearance: Normal appearance.   HENT:      Head: Normocephalic and atraumatic.      Mouth/Throat:      Comments: Class 3 airway  Eyes:      Conjunctiva/sclera: Conjunctivae normal.   Pulmonary:      Effort: Pulmonary effort is normal. No respiratory distress.      Breath sounds: No wheezing.   Skin:     Coloration: Skin is not pale.      Findings: No erythema.   Neurological:      Mental Status: He is alert and oriented to person, place, and time.   Psychiatric:         Mood and Affect: Mood normal.         Behavior:  [employer], on behalf of [employer], is responsible for reviewing pharmacy services provided to  [address] [place] [address] members. [employer] received a request on 05/14/2019 from your prescriber for coverage of  [facility] CAP 72MCG.   Your request for [facility] CAP 72MCG has been appr Behavior normal.         Thought Content: Thought content normal.         Judgment: Judgment normal.     23/30 days of use  Greater than 4-hour use 60%  90% IPAP 11.390% EPAP 8.7  AHI of 0.8      ASSESSMENT/PLAN    Diagnoses and all orders for this visit:    1. HAWA (obstructive sleep apnea) (Primary)            1. Counseled patient regarding multimodal approach with healthy nutrition, healthy sleep, regular physical activity, social activities, counseling, and medications. Encouraged to practice lateral  sleep position. Avoid alcohol and sedatives close to bedtime.  2.   Patient to continue CPAP use and increased use.  Patient gave verbal consent for video visit today and I will see him back in a year  I have reviewed the results of my evaluation and impression and discussed my recommendations in detail with the patient.      Signed by  Tere Chaparro, MAGALIE    October 14, 2020      CC: Fernando Booth MD          No ref. provider found

## 2020-11-16 ENCOUNTER — OFFICE VISIT (OUTPATIENT)
Dept: INTERNAL MEDICINE | Facility: CLINIC | Age: 55
End: 2020-11-16

## 2020-11-16 VITALS
HEART RATE: 60 BPM | DIASTOLIC BLOOD PRESSURE: 58 MMHG | TEMPERATURE: 98.4 F | WEIGHT: 194 LBS | SYSTOLIC BLOOD PRESSURE: 92 MMHG | RESPIRATION RATE: 16 BRPM | BODY MASS INDEX: 29.5 KG/M2

## 2020-11-16 DIAGNOSIS — K21.9 GASTROESOPHAGEAL REFLUX DISEASE WITHOUT ESOPHAGITIS: Primary | ICD-10-CM

## 2020-11-16 DIAGNOSIS — E78.5 HYPERLIPIDEMIA, UNSPECIFIED HYPERLIPIDEMIA TYPE: ICD-10-CM

## 2020-11-16 LAB
ALBUMIN SERPL-MCNC: 4.2 G/DL (ref 3.5–5.2)
ALBUMIN/GLOB SERPL: 1.6 G/DL
ALP SERPL-CCNC: 58 U/L (ref 39–117)
ALT SERPL W P-5'-P-CCNC: 57 U/L (ref 1–41)
ANION GAP SERPL CALCULATED.3IONS-SCNC: 9.7 MMOL/L (ref 5–15)
AST SERPL-CCNC: 27 U/L (ref 1–40)
BILIRUB SERPL-MCNC: 0.4 MG/DL (ref 0–1.2)
BUN SERPL-MCNC: 23 MG/DL (ref 6–20)
BUN/CREAT SERPL: 20.2 (ref 7–25)
CALCIUM SPEC-SCNC: 9.6 MG/DL (ref 8.6–10.5)
CHLORIDE SERPL-SCNC: 102 MMOL/L (ref 98–107)
CHOLEST SERPL-MCNC: 85 MG/DL (ref 0–200)
CO2 SERPL-SCNC: 25.3 MMOL/L (ref 22–29)
CREAT SERPL-MCNC: 1.14 MG/DL (ref 0.76–1.27)
GFR SERPL CREATININE-BSD FRML MDRD: 67 ML/MIN/1.73
GLOBULIN UR ELPH-MCNC: 2.7 GM/DL
GLUCOSE SERPL-MCNC: 88 MG/DL (ref 65–99)
HDLC SERPL-MCNC: 38 MG/DL (ref 40–60)
LDLC SERPL CALC-MCNC: 32 MG/DL (ref 0–100)
LDLC/HDLC SERPL: 0.88 {RATIO}
POTASSIUM SERPL-SCNC: 4.1 MMOL/L (ref 3.5–5.2)
PROT SERPL-MCNC: 6.9 G/DL (ref 6–8.5)
SODIUM SERPL-SCNC: 137 MMOL/L (ref 136–145)
TRIGL SERPL-MCNC: 67 MG/DL (ref 0–150)
VLDLC SERPL-MCNC: 15 MG/DL (ref 5–40)

## 2020-11-16 PROCEDURE — 80061 LIPID PANEL: CPT | Performed by: INTERNAL MEDICINE

## 2020-11-16 PROCEDURE — 80053 COMPREHEN METABOLIC PANEL: CPT | Performed by: INTERNAL MEDICINE

## 2020-11-16 PROCEDURE — 36415 COLL VENOUS BLD VENIPUNCTURE: CPT | Performed by: INTERNAL MEDICINE

## 2020-11-16 PROCEDURE — 99214 OFFICE O/P EST MOD 30 MIN: CPT | Performed by: INTERNAL MEDICINE

## 2020-11-16 NOTE — PROGRESS NOTES
Chief Complaint   Patient presents with   • Follow-up     6 month follow up chronic medical problems        History of Present Illness      The patient presents for a follow-up related to hyperlipidemia. He is following a low fat diet. He reports that he is exercising. He is taking atorvastatin. The patient is taking his medication as prescribed. He reports no medication side effects, including muscle cramps, abdominal pain, headaches or weakness. He denies chest pain, shortness of breath, orthopnea, paroxysmal nocturnal dyspnea, dyspnea on exertion, edema, palpitations or syncope.    The patient presents for a follow-up related to GERD. The patient is on pantoprazole for his gastroesophageal reflux. The medication is taken only as needed and gives complete relief of the symptoms. He reports no abdominal pain, belching, diarrhea, dysphagia, early satiety, heartburn, hoarseness, nausea, odynophagia, rectal bleeding, vomiting or weight loss. The GERD has no known aggravating factors.    Review of Systems    CONSTITUTIONAL- Denies Fever, Chills, Sweats, Fatigue, Weakness or Malaise.    HENT- Denies Nasal Discharge, Sore Throat, Ear Pain, Ear Drainage, Headache, Sinus Pain, Nasal Congestion, Decreased Hearing or Tinnitus.    GASTROINTESTINAL- Denies Constipation.    PULMONARY- Denies Wheezing, Sputum Production, Cough, Hemoptysis or Pleuritic Chest Pain.    CARDIOVASCULAR- Denies Claudication or Irregular Heart Beat.    Medications      Current Outpatient Medications:   •  atorvastatin (LIPITOR) 20 MG tablet, Take 1 tablet by mouth Daily., Disp: 90 tablet, Rfl: 1  •  pantoprazole (PROTONIX) 40 MG EC tablet, TAKE 1 TABLET BY MOUTH ONCE DAILY, Disp: 30 tablet, Rfl: 2     Allergies    No Known Allergies    Problem List    Patient Active Problem List   Diagnosis   • Severe obstructive sleep apnea   • Burst fracture of twelfth thoracic vertebra (CMS/HCC)   • status post nonsegmental pedicle screw fixation T11-L1 without  fusion        Medications, Allergies, Problems List and Past History were reviewed and updated.    Physical Examination    BP 92/58 (BP Location: Left arm, Patient Position: Sitting, Cuff Size: Adult)   Pulse 60   Temp 98.4 °F (36.9 °C) (Infrared)   Resp 16   Wt 88 kg (194 lb)   BMI 29.50 kg/m²     HEENT: Facies- Within normal limits. Lids- Normal bilaterally. Conjunctiva- Clear bilaterally. Sclera- Anicteric bilaterally.    Neck: Thyroid- non enlarged, symmetric and has no nodules. No bruits are detected.    Lungs: Auscultation- Clear to auscultation bilaterally. There are no retractions, clubbing or cyanosis. The Expiratory to Inspiratory ratio is equal.    Cardiovascular: There are no carotid bruits. Heart- Normal Rate with Regular rhythm and no murmurs. There are no gallops. There are no rubs. In the lower extremities there is no edema. The upper extremities do not have edema.    Abdomen: Soft, benign, non-tender with no masses, hernias, organomegaly or scars.    Impression and Assessment    Hyperlipidemia.    Gastroesophageal Reflux Disease.    Plan    Gastroesophageal Reflux Disease Plan: The current plan was continued.    Hyperlipidemia Plan: The patient was instructed to exercise daily, eat a low fat diet and continue his medications.    Diagnoses and all orders for this visit:    1. Gastroesophageal reflux disease without esophagitis (Primary)    2. Hyperlipidemia, unspecified hyperlipidemia type  -     Comprehensive Metabolic Panel  -     Lipid Panel        Return to Office    The patient was instructed to return for follow-up in 6 months.    The patient was instructed to return sooner if the condition changes, worsens, or does not resolve.

## 2021-02-23 RX ORDER — ATORVASTATIN CALCIUM 20 MG/1
20 TABLET, FILM COATED ORAL DAILY
Qty: 90 TABLET | Refills: 1 | Status: SHIPPED | OUTPATIENT
Start: 2021-02-23 | End: 2021-09-08 | Stop reason: SDUPTHER

## 2021-06-01 ENCOUNTER — OFFICE VISIT (OUTPATIENT)
Dept: INTERNAL MEDICINE | Facility: CLINIC | Age: 56
End: 2021-06-01

## 2021-06-01 VITALS
HEIGHT: 67 IN | DIASTOLIC BLOOD PRESSURE: 62 MMHG | SYSTOLIC BLOOD PRESSURE: 102 MMHG | HEART RATE: 64 BPM | WEIGHT: 184 LBS | RESPIRATION RATE: 16 BRPM | TEMPERATURE: 98 F | BODY MASS INDEX: 28.88 KG/M2

## 2021-06-01 DIAGNOSIS — Z12.5 PROSTATE CANCER SCREENING: ICD-10-CM

## 2021-06-01 DIAGNOSIS — K21.9 GASTROESOPHAGEAL REFLUX DISEASE WITHOUT ESOPHAGITIS: ICD-10-CM

## 2021-06-01 DIAGNOSIS — E78.5 HYPERLIPIDEMIA, UNSPECIFIED HYPERLIPIDEMIA TYPE: ICD-10-CM

## 2021-06-01 DIAGNOSIS — Z00.00 PHYSICAL EXAM: Primary | ICD-10-CM

## 2021-06-01 LAB
BILIRUB BLD-MCNC: NEGATIVE MG/DL
CLARITY, POC: CLEAR
COLOR UR: YELLOW
EXPIRATION DATE: NORMAL
GLUCOSE UR STRIP-MCNC: NEGATIVE MG/DL
KETONES UR QL: NEGATIVE
LEUKOCYTE EST, POC: NEGATIVE
Lab: NORMAL
NITRITE UR-MCNC: NEGATIVE MG/ML
PH UR: 7 [PH] (ref 5–8)
PROT UR STRIP-MCNC: NEGATIVE MG/DL
RBC # UR STRIP: NEGATIVE /UL
SP GR UR: 1.01 (ref 1–1.03)
UROBILINOGEN UR QL: NORMAL

## 2021-06-01 PROCEDURE — 85025 COMPLETE CBC W/AUTO DIFF WBC: CPT | Performed by: INTERNAL MEDICINE

## 2021-06-01 PROCEDURE — 81003 URINALYSIS AUTO W/O SCOPE: CPT | Performed by: INTERNAL MEDICINE

## 2021-06-01 PROCEDURE — 84443 ASSAY THYROID STIM HORMONE: CPT | Performed by: INTERNAL MEDICINE

## 2021-06-01 PROCEDURE — 80053 COMPREHEN METABOLIC PANEL: CPT | Performed by: INTERNAL MEDICINE

## 2021-06-01 PROCEDURE — 80061 LIPID PANEL: CPT | Performed by: INTERNAL MEDICINE

## 2021-06-01 PROCEDURE — 99396 PREV VISIT EST AGE 40-64: CPT | Performed by: INTERNAL MEDICINE

## 2021-06-01 PROCEDURE — G0103 PSA SCREENING: HCPCS | Performed by: INTERNAL MEDICINE

## 2021-06-01 PROCEDURE — 86803 HEPATITIS C AB TEST: CPT | Performed by: INTERNAL MEDICINE

## 2021-06-01 NOTE — PROGRESS NOTES
Chief Complaint   Patient presents with   • Annual Exam       History of Present Illness    The patient presents for an established patient physical examination and health maintenance visit.    The patient presents for a follow-up related to hyperlipidemia. He is following a low fat diet. He reports that he is exercising. He is taking atorvastatin. The patient is taking his medication as prescribed. He reports no medication side effects, including muscle cramps, abdominal pain, headaches or weakness. He denies chest pain, shortness of breath, orthopnea, paroxysmal nocturnal dyspnea, dyspnea on exertion, edema, palpitations or syncope.    The patient presents for a follow-up related to GERD. The patient is on pantoprazole for his gastroesophageal reflux. The medication is taken on a regular basis and gives complete relief of the symptoms. He reports no abdominal pain, belching, diarrhea, dysphagia, early satiety, heartburn, hoarseness, nausea, odynophagia, rectal bleeding, vomiting or weight loss. The GERD has no known aggravating factors.    Review of Systems    CONSTITUTIONAL- Denies Fever, Chills, Sweats, Fatigue, Weakness or Malaise.    NECK- Denies Decreased Range of Motion, Stiffness, Thyroid Nodules, Enlarged Lymph Nodes or Goiter.    EYES- Denies Eye Pain, Eye Drainage, Eye Redness, Eye Discharge, Decreased Vision, Visual Disturbance, Diplopia, Visual Loss or Swollen Eyelid.    HENT- Denies Nasal Discharge, Sore Throat, Ear Pain, Ear Drainage, Headache, Sinus Pain, Nasal Congestion, Decreased Hearing or Tinnitus.    PULMONARY- Denies Wheezing, Sputum Production, Cough, Hemoptysis or Pleuritic Chest Pain.    GASTROINTESTINAL- Denies Constipation.    GENITOURINARY- Denies Penile Discharge, Erectile Dysfunction, Testicular Mass, Testicular Pain, Hernia, Nocturia, Decreased Urine Stream, Incomplete Voiding, Urinary Frequency, Urinary Urgency, Dysuria or Hematuria.    CARDIOVASCULAR- Denies Claudication or  "Irregular Heart Beat.    ENDOCRINE- Denies Cold Intolerance, Depression, Hair Loss, Heat Intolerance, Memory Loss, Polydypsia, Polyphagia, Polyuria, Sleep Disturbance or Weight Gain.    NEUROLOGIC- Denies Seizures, Visual Changes, Confusion or Excessive Daytime Sleepiness.    MUSCULOSKELETAL- Denies Joint Pain, Joint Stiffness, Decreased Range of Motion, Joint Swelling or Erythema of Joints.    INTEGUMENTARY- Denies Rash, Lumps, Sores, Itching, Dryness, Color Change, Changes in Hair, Brittle Nails, Discolored Nails, Thickened Nails, Growths or Alopecia.    Medications      Current Outpatient Medications:   •  atorvastatin (LIPITOR) 20 MG tablet, TAKE 1 TABLET BY MOUTH DAILY, Disp: 90 tablet, Rfl: 1  •  pantoprazole (PROTONIX) 40 MG EC tablet, TAKE 1 TABLET BY MOUTH ONCE DAILY (Patient taking differently: Daily As Needed.), Disp: 30 tablet, Rfl: 2     Allergies    No Known Allergies    Problem List    Patient Active Problem List   Diagnosis   • Severe obstructive sleep apnea   • Burst fracture of twelfth thoracic vertebra (CMS/HCC)   • status post nonsegmental pedicle screw fixation T11-L1 without fusion        Medications, Allergies, Problems List and Past History were reviewed and updated.    Physical Examination    /62 (BP Location: Left arm, Patient Position: Sitting, Cuff Size: Adult)   Pulse 64   Temp 98 °F (36.7 °C) (Infrared)   Resp 16   Ht 170.8 cm (67.25\")   Wt 83.5 kg (184 lb)   BMI 28.60 kg/m²     HEENT: Head- Normocephalic Atraumatic. Facies- Within normal limits. Pinnas- Normal texture and shape bilaterally. Canals- Normal bilaterally. TMs- Normal bilaterally. Nares- Patent bilaterally. Nasal Septum- is normal. There is no tenderness to palpation over the frontal or maxillary sinuses. Lids- Normal bilaterally. Conjunctiva- Clear bilaterally. Sclera- Anicteric bilaterally. Oropharynx- Moist with no lesions. Tonsils- No enlargement, erythema or exudate.    Neck: Thyroid- non enlarged, " symmetric and has no nodules. No bruits are detected. ROM- Normal Range of Motion with no rigidity.    Lungs: Auscultation- Clear to auscultation bilaterally. There are no retractions, clubbing or cyanosis. The Expiratory to Inspiratory ratio is equal.    Lymph Nodes: Cervical- no enlarged lymph nodes noted. Clavicular- no enlarged supraclavicular lymph nodes noted. Axillary- no enlarged axillary lymph nodes noted. Inguinal- no enlarged inguinal lymph nodes noted.    Cardiovascular: There are no carotid bruits. Heart- Normal Rate with Regular rhythm and no murmurs. There are no gallops. There are no rubs. In the lower extremities there is no edema. The upper extremities do not have edema.    Abdomen: Soft, benign, non-tender with no masses, hernias, organomegaly or scars.    Male Genitourinary: The penis is circumcised with testicles found in the scrotum bilaterally. Testicular Size is normal bilaterally. The penis has no anatomic abnormalities.    Rectal: reveals normal external sphincter tone with no external lesions.    Prostate: The prostate gland is symmetric and smooth with no nodularity.    Dermatologic: The patient has no worrisome or suspicious skin lesions noted.    Impression and Assessment    Normal Physical Examination.    Encounter for Screening for Malignant Neoplasm of the Prostate.    Hyperlipidemia.    Gastroesophageal Reflux Disease.    Plan    Gastroesophageal Reflux Disease Plan: The patient was instructed to continue the current medications.    Hyperlipidemia Plan: The patient was instructed to exercise daily, eat a low fat diet and continue his medications.    Counseling was provided regarding: Adequate Aerobic Exercise, Dental Visits, Flossing Teeth and Heart Healthy Diet.    The following was ordered for screening and health maintenance: CBC w Automated Diff, CMP, Hepatitis C Antibody, Lipid Profile, PSA, TSH and U/A.       Immunizations Ordered and Administered: None.    Diagnoses and all  orders for this visit:    1. Physical exam (Primary)  -     Comprehensive Metabolic Panel  -     Lipid Panel  -     TSH  -     CBC & Differential  -     POC Urinalysis Dipstick, Automated  -     Hepatitis C Antibody    2. Hyperlipidemia, unspecified hyperlipidemia type  -     Comprehensive Metabolic Panel  -     Lipid Panel    3. Gastroesophageal reflux disease without esophagitis    4. Prostate cancer screening  -     PSA Screen        Return to Office    The patient was instructed to return for follow-up in 1 year. Next Visit: Physical Examination. The patient was instructed to return sooner if the condition changes, worsens, or does not resolve.

## 2021-06-02 LAB
ALBUMIN SERPL-MCNC: 4.4 G/DL (ref 3.5–5.2)
ALBUMIN/GLOB SERPL: 1.5 G/DL
ALP SERPL-CCNC: 48 U/L (ref 39–117)
ALT SERPL W P-5'-P-CCNC: 38 U/L (ref 1–41)
ANION GAP SERPL CALCULATED.3IONS-SCNC: 12.6 MMOL/L (ref 5–15)
AST SERPL-CCNC: 24 U/L (ref 1–40)
BASOPHILS # BLD AUTO: 0.09 10*3/MM3 (ref 0–0.2)
BASOPHILS NFR BLD AUTO: 1.4 % (ref 0–1.5)
BILIRUB SERPL-MCNC: 0.3 MG/DL (ref 0–1.2)
BUN SERPL-MCNC: 20 MG/DL (ref 6–20)
BUN/CREAT SERPL: 15 (ref 7–25)
CALCIUM SPEC-SCNC: 9.4 MG/DL (ref 8.6–10.5)
CHLORIDE SERPL-SCNC: 102 MMOL/L (ref 98–107)
CHOLEST SERPL-MCNC: 139 MG/DL (ref 0–200)
CO2 SERPL-SCNC: 25.4 MMOL/L (ref 22–29)
CREAT SERPL-MCNC: 1.33 MG/DL (ref 0.76–1.27)
DEPRECATED RDW RBC AUTO: 40.3 FL (ref 37–54)
EOSINOPHIL # BLD AUTO: 0.1 10*3/MM3 (ref 0–0.4)
EOSINOPHIL NFR BLD AUTO: 1.5 % (ref 0.3–6.2)
ERYTHROCYTE [DISTWIDTH] IN BLOOD BY AUTOMATED COUNT: 12.3 % (ref 12.3–15.4)
GFR SERPL CREATININE-BSD FRML MDRD: 56 ML/MIN/1.73
GLOBULIN UR ELPH-MCNC: 2.9 GM/DL
GLUCOSE SERPL-MCNC: 85 MG/DL (ref 65–99)
HCT VFR BLD AUTO: 48.8 % (ref 37.5–51)
HCV AB SER DONR QL: NORMAL
HDLC SERPL-MCNC: 47 MG/DL (ref 40–60)
HGB BLD-MCNC: 16.1 G/DL (ref 13–17.7)
IMM GRANULOCYTES # BLD AUTO: 0.01 10*3/MM3 (ref 0–0.05)
IMM GRANULOCYTES NFR BLD AUTO: 0.2 % (ref 0–0.5)
LDLC SERPL CALC-MCNC: 70 MG/DL (ref 0–100)
LDLC/HDLC SERPL: 1.43 {RATIO}
LYMPHOCYTES # BLD AUTO: 1.4 10*3/MM3 (ref 0.7–3.1)
LYMPHOCYTES NFR BLD AUTO: 21.1 % (ref 19.6–45.3)
MCH RBC QN AUTO: 29.2 PG (ref 26.6–33)
MCHC RBC AUTO-ENTMCNC: 33 G/DL (ref 31.5–35.7)
MCV RBC AUTO: 88.4 FL (ref 79–97)
MONOCYTES # BLD AUTO: 0.45 10*3/MM3 (ref 0.1–0.9)
MONOCYTES NFR BLD AUTO: 6.8 % (ref 5–12)
NEUTROPHILS NFR BLD AUTO: 4.57 10*3/MM3 (ref 1.7–7)
NEUTROPHILS NFR BLD AUTO: 69 % (ref 42.7–76)
NRBC BLD AUTO-RTO: 0 /100 WBC (ref 0–0.2)
PLATELET # BLD AUTO: 254 10*3/MM3 (ref 140–450)
PMV BLD AUTO: 9.5 FL (ref 6–12)
POTASSIUM SERPL-SCNC: 4 MMOL/L (ref 3.5–5.2)
PROT SERPL-MCNC: 7.3 G/DL (ref 6–8.5)
PSA SERPL-MCNC: 0.52 NG/ML (ref 0–4)
RBC # BLD AUTO: 5.52 10*6/MM3 (ref 4.14–5.8)
SODIUM SERPL-SCNC: 140 MMOL/L (ref 136–145)
TRIGL SERPL-MCNC: 125 MG/DL (ref 0–150)
TSH SERPL DL<=0.05 MIU/L-ACNC: 1.93 UIU/ML (ref 0.27–4.2)
VLDLC SERPL-MCNC: 22 MG/DL (ref 5–40)
WBC # BLD AUTO: 6.62 10*3/MM3 (ref 3.4–10.8)

## 2021-08-30 ENCOUNTER — OFFICE VISIT (OUTPATIENT)
Dept: SLEEP MEDICINE | Facility: HOSPITAL | Age: 56
End: 2021-08-30

## 2021-08-30 VITALS
DIASTOLIC BLOOD PRESSURE: 71 MMHG | HEART RATE: 70 BPM | BODY MASS INDEX: 29.47 KG/M2 | OXYGEN SATURATION: 97 % | WEIGHT: 187.8 LBS | HEIGHT: 67 IN | SYSTOLIC BLOOD PRESSURE: 118 MMHG

## 2021-08-30 DIAGNOSIS — G47.33 OSA (OBSTRUCTIVE SLEEP APNEA): Primary | ICD-10-CM

## 2021-08-30 PROCEDURE — 99213 OFFICE O/P EST LOW 20 MIN: CPT | Performed by: NURSE PRACTITIONER

## 2021-08-30 NOTE — PROGRESS NOTES
"     Chief Complaint:   Chief Complaint   Patient presents with   • Follow-up       HPI:    Vickey Dunaway is a 55 y.o. male here for follow-up of severe sleep apnea.  Patient was last seen 10/14/2020 he was on BiPAP therapy at that time.  His last use shows 3/23/2021.  Patient states \"I got out of the habit of using.\"  After the recall was made on [employer] he did decide to not restart.  I have spoken with him today regarding his severe sleep apnea versus the consequences of untreated sleep apnea.  Patient does feel safer about use now and we will restart.  He is currently sleeping 7 hours nightly and does feel refreshed upon awakening.  He will go to sleep within 15 minutes and does not get up during the night.  Patient has an [place] score of 6/24.  Patient understands the consequences of untreated sleep apnea and will restart use.        Current medications are:   Current Outpatient Medications:   •  atorvastatin (LIPITOR) 20 MG tablet, TAKE 1 TABLET BY MOUTH DAILY, Disp: 90 tablet, Rfl: 1  •  pantoprazole (PROTONIX) 40 MG EC tablet, TAKE 1 TABLET BY MOUTH ONCE DAILY (Patient taking differently: Daily As Needed.), Disp: 30 tablet, Rfl: 2.      The patient's relevant past medical, surgical, family and social history were reviewed and updated in Epic as appropriate.       Review of Systems   Respiratory: Positive for apnea.    Gastrointestinal:        Heartburn   Psychiatric/Behavioral: Positive for sleep disturbance.   All other systems reviewed and are negative.        Objective:    Physical Exam  Constitutional:       Appearance: Normal appearance.   HENT:      Head: Normocephalic and atraumatic.      Mouth/Throat:      Pharynx: Oropharynx is clear.      Comments: Mallampati 3 anatomy  Eyes:      Conjunctiva/sclera: Conjunctivae normal.      Pupils: Pupils are equal, round, and reactive to light.   Cardiovascular:      Rate and Rhythm: Normal rate and regular rhythm.   Pulmonary:      Effort: Pulmonary " effort is normal.      Breath sounds: Normal breath sounds.   Skin:     General: Skin is dry.   Neurological:      Mental Status: He is alert and oriented to person, place, and time.   Psychiatric:         Mood and Affect: Mood normal.         Behavior: Behavior normal.         Thought Content: Thought content normal.         Judgment: Judgment normal.     56/90 days of use  Greater than 4-hour use 52.2  90% IPAP 11.9  90% EPAP 9.0  AHI of 0.9  ASSESSMENT/PLAN    Diagnoses and all orders for this visit:    1. HAWA (obstructive sleep apnea) (Primary)  -     CPAP Therapy            1. Counseled patient regarding multimodal approach with healthy nutrition, healthy sleep, regular physical activity, social activities, counseling, and medications. Encouraged to practice lateral sleep position. Avoid alcohol and sedatives close to bedtime.  2. Patient will restart BiPAP therapy.  I will see him back in 2 to 3 months to reassess    I have reviewed the results of my evaluation and impression and discussed my recommendations in detail with the patient.      Signed by  MAGALIE Castaneda    August 30, 2021      CC: Fernando Booth MD          No ref. provider found

## 2021-09-08 RX ORDER — ATORVASTATIN CALCIUM 20 MG/1
20 TABLET, FILM COATED ORAL DAILY
Qty: 90 TABLET | Refills: 1 | Status: SHIPPED | OUTPATIENT
Start: 2021-09-08 | End: 2022-02-21

## 2021-11-08 ENCOUNTER — OFFICE VISIT (OUTPATIENT)
Dept: SLEEP MEDICINE | Facility: HOSPITAL | Age: 56
End: 2021-11-08

## 2021-11-08 VITALS
BODY MASS INDEX: 29.85 KG/M2 | HEIGHT: 67 IN | SYSTOLIC BLOOD PRESSURE: 109 MMHG | OXYGEN SATURATION: 97 % | DIASTOLIC BLOOD PRESSURE: 60 MMHG | HEART RATE: 64 BPM | WEIGHT: 190.2 LBS

## 2021-11-08 DIAGNOSIS — G47.33 OSA (OBSTRUCTIVE SLEEP APNEA): Primary | ICD-10-CM

## 2021-11-08 PROCEDURE — 99213 OFFICE O/P EST LOW 20 MIN: CPT | Performed by: NURSE PRACTITIONER

## 2021-11-08 NOTE — PROGRESS NOTES
Chief Complaint:   Chief Complaint   Patient presents with   • Follow-up       HPI:    Vickey Dunaway is a 55 y.o. male here for follow-up of severe sleep apnea.  Patient was last seen 8/30/2021.  Patient was not using his machine on last visit due to the [employer] recall.  We did discuss the consequences of untreated sleep apnea and patient did wish to restart.  Patient is now doing very well sleeping 7 hours nightly feeling refreshed upon awakening.  Patient will go to sleep within 5 to 10 minutes and does not get up during the night.  Patient has an Silvis score of 6/24.  Patient continues to do well now with BiPAP and wishes to continue.  His machine has been [email or web address]        Current medications are:   Current Outpatient Medications:   •  atorvastatin (LIPITOR) 20 MG tablet, Take 1 tablet by mouth Daily., Disp: 90 tablet, Rfl: 1  •  pantoprazole (PROTONIX) 40 MG EC tablet, TAKE 1 TABLET BY MOUTH ONCE DAILY (Patient taking differently: Daily As Needed.), Disp: 30 tablet, Rfl: 2.      The patient's relevant past medical, surgical, family and social history were reviewed and updated in Epic as appropriate.       Review of Systems   Respiratory: Positive for apnea.    Gastrointestinal:        Heartburn   Neurological: Negative for headaches.   Psychiatric/Behavioral: Positive for sleep disturbance.   All other systems reviewed and are negative.        Objective:    Physical Exam  Constitutional:       Appearance: Normal appearance.   HENT:      Head: Normocephalic and atraumatic.      Mouth/Throat:      Comments: Mallampati 3 anatomy  Pulmonary:      Effort: Pulmonary effort is normal. No respiratory distress.   Skin:     General: Skin is warm and dry.   Neurological:      Mental Status: He is alert and oriented to person, place, and time.   Psychiatric:         Behavior: Behavior normal.         Thought Content: Thought content normal.         Judgment: Judgment normal.     27/30 days of  use  Greater than 4-hour use 83.3  90% IPAP 11.5  90% EPAP 8.5  AHI of 0.9      ASSESSMENT/PLAN    Diagnoses and all orders for this visit:    1. HAWA (obstructive sleep apnea) (Primary)  -     CPAP Therapy            1. Counseled patient regarding multimodal approach with healthy nutrition, healthy sleep, regular physical activity, social activities, counseling, and medications. Encouraged to practice lateral sleep position. Avoid alcohol and sedatives close to bedtime.  2.   Refill supplies x1 year.  I will see patient back in 1 year or sooner symptoms warrant.  I have reviewed the results of my evaluation and impression and discussed my recommendations in detail with the patient.      Signed by  Tere Chaparro, MAGALIE    November 8, 2021      CC: Fernando Booth MD          No ref. provider found

## 2022-02-21 RX ORDER — ATORVASTATIN CALCIUM 20 MG/1
20 TABLET, FILM COATED ORAL DAILY
Qty: 90 TABLET | Refills: 1 | Status: SHIPPED | OUTPATIENT
Start: 2022-02-21 | End: 2022-08-25

## 2022-06-07 ENCOUNTER — OFFICE VISIT (OUTPATIENT)
Dept: INTERNAL MEDICINE | Facility: CLINIC | Age: 57
End: 2022-06-07

## 2022-06-07 ENCOUNTER — LAB (OUTPATIENT)
Dept: LAB | Facility: HOSPITAL | Age: 57
End: 2022-06-07

## 2022-06-07 VITALS
BODY MASS INDEX: 28.82 KG/M2 | WEIGHT: 190.2 LBS | TEMPERATURE: 98.2 F | SYSTOLIC BLOOD PRESSURE: 110 MMHG | HEART RATE: 68 BPM | DIASTOLIC BLOOD PRESSURE: 78 MMHG | HEIGHT: 68 IN | RESPIRATION RATE: 18 BRPM

## 2022-06-07 DIAGNOSIS — E78.5 HYPERLIPIDEMIA, UNSPECIFIED HYPERLIPIDEMIA TYPE: ICD-10-CM

## 2022-06-07 DIAGNOSIS — K21.9 GASTROESOPHAGEAL REFLUX DISEASE WITHOUT ESOPHAGITIS: ICD-10-CM

## 2022-06-07 DIAGNOSIS — Z12.5 PROSTATE CANCER SCREENING: ICD-10-CM

## 2022-06-07 DIAGNOSIS — Z00.00 PHYSICAL EXAM: Primary | ICD-10-CM

## 2022-06-07 LAB
ALBUMIN SERPL-MCNC: 4.4 G/DL (ref 3.5–5.2)
ALBUMIN/GLOB SERPL: 1.9 G/DL
ALP SERPL-CCNC: 52 U/L (ref 39–117)
ALT SERPL W P-5'-P-CCNC: 51 U/L (ref 1–41)
ANION GAP SERPL CALCULATED.3IONS-SCNC: 10 MMOL/L (ref 5–15)
AST SERPL-CCNC: 25 U/L (ref 1–40)
BASOPHILS # BLD AUTO: 0.09 10*3/MM3 (ref 0–0.2)
BASOPHILS NFR BLD AUTO: 1.2 % (ref 0–1.5)
BILIRUB BLD-MCNC: NEGATIVE MG/DL
BILIRUB SERPL-MCNC: 0.3 MG/DL (ref 0–1.2)
BUN SERPL-MCNC: 19 MG/DL (ref 6–20)
BUN/CREAT SERPL: 15.7 (ref 7–25)
CALCIUM SPEC-SCNC: 9.2 MG/DL (ref 8.6–10.5)
CHLORIDE SERPL-SCNC: 103 MMOL/L (ref 98–107)
CHOLEST SERPL-MCNC: 118 MG/DL (ref 0–200)
CLARITY, POC: CLEAR
CO2 SERPL-SCNC: 25 MMOL/L (ref 22–29)
COLOR UR: YELLOW
CREAT SERPL-MCNC: 1.21 MG/DL (ref 0.76–1.27)
DEPRECATED RDW RBC AUTO: 37.8 FL (ref 37–54)
EGFRCR SERPLBLD CKD-EPI 2021: 70.7 ML/MIN/1.73
EOSINOPHIL # BLD AUTO: 0.1 10*3/MM3 (ref 0–0.4)
EOSINOPHIL NFR BLD AUTO: 1.4 % (ref 0.3–6.2)
ERYTHROCYTE [DISTWIDTH] IN BLOOD BY AUTOMATED COUNT: 12.3 % (ref 12.3–15.4)
EXPIRATION DATE: NORMAL
GLOBULIN UR ELPH-MCNC: 2.3 GM/DL
GLUCOSE SERPL-MCNC: 95 MG/DL (ref 65–99)
GLUCOSE UR STRIP-MCNC: NEGATIVE MG/DL
HCT VFR BLD AUTO: 45.1 % (ref 37.5–51)
HDLC SERPL-MCNC: 48 MG/DL (ref 40–60)
HGB BLD-MCNC: 15.1 G/DL (ref 13–17.7)
IMM GRANULOCYTES # BLD AUTO: 0.02 10*3/MM3 (ref 0–0.05)
IMM GRANULOCYTES NFR BLD AUTO: 0.3 % (ref 0–0.5)
KETONES UR QL: NEGATIVE
LDLC SERPL CALC-MCNC: 51 MG/DL (ref 0–100)
LDLC/HDLC SERPL: 1.02 {RATIO}
LEUKOCYTE EST, POC: NEGATIVE
LYMPHOCYTES # BLD AUTO: 1.63 10*3/MM3 (ref 0.7–3.1)
LYMPHOCYTES NFR BLD AUTO: 22.1 % (ref 19.6–45.3)
Lab: NORMAL
MCH RBC QN AUTO: 28.7 PG (ref 26.6–33)
MCHC RBC AUTO-ENTMCNC: 33.5 G/DL (ref 31.5–35.7)
MCV RBC AUTO: 85.7 FL (ref 79–97)
MONOCYTES # BLD AUTO: 0.49 10*3/MM3 (ref 0.1–0.9)
MONOCYTES NFR BLD AUTO: 6.6 % (ref 5–12)
NEUTROPHILS NFR BLD AUTO: 5.06 10*3/MM3 (ref 1.7–7)
NEUTROPHILS NFR BLD AUTO: 68.4 % (ref 42.7–76)
NITRITE UR-MCNC: NEGATIVE MG/ML
NRBC BLD AUTO-RTO: 0 /100 WBC (ref 0–0.2)
PH UR: 7 [PH] (ref 5–8)
PLATELET # BLD AUTO: 231 10*3/MM3 (ref 140–450)
PMV BLD AUTO: 8.9 FL (ref 6–12)
POTASSIUM SERPL-SCNC: 3.9 MMOL/L (ref 3.5–5.2)
PROT SERPL-MCNC: 6.7 G/DL (ref 6–8.5)
PROT UR STRIP-MCNC: NEGATIVE MG/DL
PSA SERPL-MCNC: 0.5 NG/ML (ref 0–4)
RBC # BLD AUTO: 5.26 10*6/MM3 (ref 4.14–5.8)
RBC # UR STRIP: NEGATIVE /UL
SODIUM SERPL-SCNC: 138 MMOL/L (ref 136–145)
SP GR UR: 1.01 (ref 1–1.03)
TRIGL SERPL-MCNC: 105 MG/DL (ref 0–150)
TSH SERPL DL<=0.05 MIU/L-ACNC: 1.82 UIU/ML (ref 0.27–4.2)
UROBILINOGEN UR QL: NORMAL
VLDLC SERPL-MCNC: 19 MG/DL (ref 5–40)
WBC NRBC COR # BLD: 7.39 10*3/MM3 (ref 3.4–10.8)

## 2022-06-07 PROCEDURE — 84443 ASSAY THYROID STIM HORMONE: CPT | Performed by: INTERNAL MEDICINE

## 2022-06-07 PROCEDURE — G0103 PSA SCREENING: HCPCS | Performed by: INTERNAL MEDICINE

## 2022-06-07 PROCEDURE — 85025 COMPLETE CBC W/AUTO DIFF WBC: CPT | Performed by: INTERNAL MEDICINE

## 2022-06-07 PROCEDURE — 81003 URINALYSIS AUTO W/O SCOPE: CPT | Performed by: INTERNAL MEDICINE

## 2022-06-07 PROCEDURE — 80053 COMPREHEN METABOLIC PANEL: CPT | Performed by: INTERNAL MEDICINE

## 2022-06-07 PROCEDURE — 99396 PREV VISIT EST AGE 40-64: CPT | Performed by: INTERNAL MEDICINE

## 2022-06-07 PROCEDURE — 80061 LIPID PANEL: CPT | Performed by: INTERNAL MEDICINE

## 2022-06-07 NOTE — PROGRESS NOTES
Chief Complaint   Patient presents with   • Annual Exam       History of Present Illness      The patient presents for an established patient physical examination and health maintenance visit.    The patient presents for a follow-up related to hyperlipidemia. He is following a low fat diet. He reports that he is exercising. He is taking atorvastatin. The patient is taking his medication as prescribed. He reports no medication side effects, including muscle cramps, abdominal pain, headaches or weakness. He denies chest pain, shortness of breath, orthopnea, paroxysmal nocturnal dyspnea, dyspnea on exertion, edema, palpitations or syncope.    The patient presents for a follow-up related to GERD. The patient is on pantoprazole for his gastroesophageal reflux. The medication is taken only as needed and gives complete relief of the symptoms. He reports no abdominal pain, belching, diarrhea, dysphagia, early satiety, heartburn, hoarseness, nausea, odynophagia, rectal bleeding, vomiting or weight loss. The GERD has no known aggravating factors.    Review of Systems    CONSTITUTIONAL- Denies Fever, Chills, Sweats, Fatigue, Weakness or Malaise.    NECK- Denies Decreased Range of Motion, Stiffness, Thyroid Nodules, Enlarged Lymph Nodes or Goiter.    EYES- Denies Eye Pain, Eye Drainage, Eye Redness, Eye Discharge, Decreased Vision, Visual Disturbance, Diplopia, Visual Loss or Swollen Eyelid.    HENT- Denies Nasal Discharge, Sore Throat, Ear Pain, Ear Drainage, Headache, Sinus Pain, Nasal Congestion, Decreased Hearing or Tinnitus.    PULMONARY- Denies Wheezing, Sputum Production, Cough, Hemoptysis or Pleuritic Chest Pain.    GASTROINTESTINAL- Denies Constipation.    GENITOURINARY- Denies Penile Discharge, Erectile Dysfunction, Testicular Mass, Testicular Pain, Hernia, Nocturia, Decreased Urine Stream, Incomplete Voiding, Urinary Frequency, Urinary Urgency, Dysuria or Hematuria.    CARDIOVASCULAR- Denies Claudication or Irregular  "Heart Beat.    ENDOCRINE- Denies Cold Intolerance, Depression, Hair Loss, Heat Intolerance, Memory Loss, Polydypsia, Polyphagia, Polyuria, Sleep Disturbance or Weight Gain.    NEUROLOGIC- Denies Seizures, Visual Changes, Confusion or Excessive Daytime Sleepiness.    MUSCULOSKELETAL- Denies Joint Pain, Joint Stiffness, Decreased Range of Motion, Joint Swelling or Erythema of Joints.    INTEGUMENTARY- Denies Rash, Lumps, Sores, Itching, Dryness, Color Change, Changes in Hair, Brittle Nails, Discolored Nails, Thickened Nails, Growths or Alopecia.    Medications      Current Outpatient Medications:   •  atorvastatin (LIPITOR) 20 MG tablet, TAKE 1 TABLET BY MOUTH DAILY, Disp: 90 tablet, Rfl: 1  •  pantoprazole (PROTONIX) 40 MG EC tablet, TAKE 1 TABLET BY MOUTH ONCE DAILY (Patient taking differently: Daily As Needed.), Disp: 30 tablet, Rfl: 2     Allergies    No Known Allergies    Problem List    Patient Active Problem List   Diagnosis   • Severe obstructive sleep apnea   • Burst fracture of twelfth thoracic vertebra (HCC)   • status post nonsegmental pedicle screw fixation T11-L1 without fusion        Medications, Allergies, Problems List and Past History were reviewed and updated.    Physical Examination    /78 (BP Location: Left arm, Patient Position: Sitting, Cuff Size: Adult)   Pulse 68   Temp 98.2 °F (36.8 °C) (Infrared)   Resp 18   Ht 171.5 cm (67.5\")   Wt 86.3 kg (190 lb 3.2 oz)   BMI 29.35 kg/m²     HEENT: Head- Normocephalic Atraumatic. Facies- Within normal limits. Pinnas- Normal texture and shape bilaterally. Canals- Normal bilaterally. TMs- Normal bilaterally. Nares- Patent bilaterally. Nasal Septum- is normal. There is no tenderness to palpation over the frontal or maxillary sinuses. Lids- Normal bilaterally. Conjunctiva- Clear bilaterally. Sclera- Anicteric bilaterally. Oropharynx- Moist with no lesions. Tonsils- No enlargement, erythema or exudate.    Neck: Thyroid- non enlarged, symmetric and " has no nodules. No bruits are detected. ROM- Normal Range of Motion with no rigidity.    Lungs: Auscultation- Clear to auscultation bilaterally. There are no retractions, clubbing or cyanosis. The Expiratory to Inspiratory ratio is equal.    Lymph Nodes: Cervical- no enlarged lymph nodes noted. Clavicular- no enlarged supraclavicular lymph nodes noted. Axillary- no enlarged axillary lymph nodes noted. Inguinal- no enlarged inguinal lymph nodes noted.    Cardiovascular: There are no carotid bruits. Heart- Normal Rate with Regular rhythm and no murmurs. There are no gallops. There are no rubs. In the lower extremities there is no edema. The upper extremities do not have edema.    Abdomen: Soft, benign, non-tender with no masses, hernias, organomegaly or scars.    Male Genitourinary: The penis is circumcised with testicles found in the scrotum bilaterally. Testicular Size is normal bilaterally. The penis has no anatomic abnormalities.    Rectal: reveals normal external sphincter tone with no external lesions.    Prostate: The prostate gland is symmetric and smooth with no nodularity.    Dermatologic: The patient has no worrisome or suspicious skin lesions noted.    Impression and Assessment    Normal Physical Examination.    Encounter for Screening for Malignant Neoplasm of the Prostate.    Hyperlipidemia.    Gastroesophageal Reflux Disease.    Plan    Gastroesophageal Reflux Disease Plan: The patient was instructed to continue the current medications.    Hyperlipidemia Plan: The patient was instructed to exercise daily, eat a low fat diet and continue his medications.    Counseling was provided regarding: Adequate Aerobic Exercise, Dental Visits, Flossing Teeth, Heart Healthy Diet and Seat Belt Utilization.    The following was ordered for screening and health maintenance: CBC w Automated Diff, CMP, Lipid Profile, PSA, TSH and U/A.       Immunizations Ordered and Administered: None.    Diagnoses and all orders for  this visit:    1. Physical exam (Primary)  -     POC Urinalysis Dipstick, Automated; Future    2. Hyperlipidemia, unspecified hyperlipidemia type  -     Comprehensive Metabolic Panel; Future  -     Lipid Panel; Future  -     TSH; Future    3. Gastroesophageal reflux disease without esophagitis  -     CBC & Differential; Future    4. Prostate cancer screening  -     PSA Screen; Future        Return to Office    The patient was instructed to return for follow-up in 6 months. Next Visit: Follow-up. The patient was instructed to return sooner if the condition changes, worsens, or does not resolve.

## 2022-08-01 ENCOUNTER — TELEPHONE (OUTPATIENT)
Dept: INTERNAL MEDICINE | Facility: CLINIC | Age: 57
End: 2022-08-01

## 2022-08-01 RX ORDER — AMOXICILLIN AND CLAVULANATE POTASSIUM 875; 125 MG/1; MG/1
1 TABLET, FILM COATED ORAL 2 TIMES DAILY
Qty: 20 TABLET | Refills: 0 | Status: SHIPPED | OUTPATIENT
Start: 2022-08-01

## 2022-08-01 RX ORDER — METHYLPREDNISOLONE 4 MG/1
TABLET ORAL
Qty: 21 TABLET | Refills: 0 | Status: SHIPPED | OUTPATIENT
Start: 2022-08-01

## 2022-08-01 NOTE — TELEPHONE ENCOUNTER
Caller: Vickey Dunaway    Relationship: Self    Best call back number: [phone]    Requested Prescriptions:   SOMETHING FOR COVID SYMPTOMS     Pharmacy where request should be sent: [facility]     Additional details provided by patient: PATIENT TESTED COVID POSITIVE 7/21/22, STILL HAS SINUS CONGESTION, PRODUCTIVE COUGH WITH PHLEGM, EXPOSURE TO POSITIVE COVID IN WIFE,  LEAVING TOWN 8/2/22 AFTERNOON. REQUESTING MEDICATION FOR SYMPTOMS      UNABLE TO GET IN FOR VIRTUAL OR IN OFFICE TODAY    Jer Gonzalez Rep   08/01/22 12:48 EDT

## 2022-08-01 NOTE — TELEPHONE ENCOUNTER
Called patient.  He has a sinus infection.    Called in Augmentin and medrol dose pack.  If symptoms worsen, don't improve, develops a fever needs to be seen.   Fernando Booth MD  16:23 EDT  08/01/22

## 2022-08-12 NOTE — TELEPHONE ENCOUNTER
Called and left v/m for Pt to confirm appt date, time and location ES 8/12/22 Sounds like the patient is having an adhesive allergy to the dressings.  Patient should be able to keep incision uncovered and use Benadryl cream on the affected areas.     Please call in clinic data about patient's job and activities He is required to do.  We will discuss return to work with care tomorrow.

## 2022-08-25 RX ORDER — ATORVASTATIN CALCIUM 20 MG/1
20 TABLET, FILM COATED ORAL DAILY
Qty: 90 TABLET | Refills: 1 | Status: SHIPPED | OUTPATIENT
Start: 2022-08-25 | End: 2022-09-30 | Stop reason: SDUPTHER

## 2022-10-03 RX ORDER — ATORVASTATIN CALCIUM 20 MG/1
20 TABLET, FILM COATED ORAL DAILY
Qty: 90 TABLET | Refills: 1 | Status: SHIPPED | OUTPATIENT
Start: 2022-10-03 | End: 2023-02-13 | Stop reason: SDUPTHER

## 2022-11-08 ENCOUNTER — OFFICE VISIT (OUTPATIENT)
Dept: SLEEP MEDICINE | Facility: HOSPITAL | Age: 57
End: 2022-11-08

## 2022-11-08 VITALS
SYSTOLIC BLOOD PRESSURE: 110 MMHG | HEART RATE: 59 BPM | OXYGEN SATURATION: 95 % | WEIGHT: 201.4 LBS | HEIGHT: 68 IN | DIASTOLIC BLOOD PRESSURE: 58 MMHG | BODY MASS INDEX: 30.52 KG/M2

## 2022-11-08 DIAGNOSIS — G47.33 OSA (OBSTRUCTIVE SLEEP APNEA): Primary | ICD-10-CM

## 2022-11-08 PROCEDURE — 99213 OFFICE O/P EST LOW 20 MIN: CPT | Performed by: NURSE PRACTITIONER

## 2022-11-08 NOTE — PROGRESS NOTES
Chief Complaint:   Chief Complaint   Patient presents with   • Follow-up       HPI:    Vickey Dunaway is a 56 y.o. male here for follow-up of sleep apnea.  Patient was last seen 11/8/2021.  Patient continues to do well with BiPAP therapy.  Patient states he does sleep 7 hours nightly and is refreshed upon awakening.  Patient will go to sleep within 5 to 10 minutes and does get up during the nigh x1 t.  Patient has an Kent score of 8/24.  We did discuss today 58 out of the last 90 days of use with a greater than 4-hour use of 60%.  Patient does say he gets up around 3 or 4 and will not put his mask back on.  We did discuss the necessity to improve compliance.        Current medications are:   Current Outpatient Medications:   •  amoxicillin-clavulanate (Augmentin) 875-125 MG per tablet, Take 1 tablet by mouth 2 (Two) Times a Day., Disp: 20 tablet, Rfl: 0  •  atorvastatin (LIPITOR) 20 MG tablet, Take 1 tablet by mouth Daily., Disp: 90 tablet, Rfl: 1  •  methylPREDNISolone (MEDROL) 4 MG dose pack, Take as directed on package instructions., Disp: 21 tablet, Rfl: 0  •  pantoprazole (PROTONIX) 40 MG EC tablet, TAKE 1 TABLET BY MOUTH ONCE DAILY (Patient taking differently: Daily As Needed.), Disp: 30 tablet, Rfl: 2.      The patient's relevant past medical, surgical, family and social history were reviewed and updated in Epic as appropriate.       Review of Systems   Respiratory: Positive for apnea.    Gastrointestinal:        Heartburn   Psychiatric/Behavioral: Positive for sleep disturbance.   All other systems reviewed and are negative.        Objective:    Physical Exam  Constitutional:       Appearance: Normal appearance.   HENT:      Head: Normocephalic and atraumatic.      Mouth/Throat:      Comments: Mallampati 3 anatomy  Cardiovascular:      Rate and Rhythm: Regular rhythm. Bradycardia present.   Pulmonary:      Effort: Pulmonary effort is normal.      Breath sounds: Normal breath sounds.   Skin:      "General: Skin is warm and dry.   Neurological:      Mental Status: He is alert and oriented to person, place, and time.   Psychiatric:         Mood and Affect: Mood normal.         Behavior: Behavior normal.         Thought Content: Thought content normal.         Judgment: Judgment normal.     /58   Pulse 59   Ht 171.5 cm (67.5\")   Wt 91.4 kg (201 lb 6.4 oz)   SpO2 95%   BMI 31.08 kg/m²       CPAP Report  58/90 days of use  Greater than 4-hour use 60%  90% IPAP 11.5  90% EPAP 8.7  AHI 0.7    The patient continues to use and benefit from CPAP therapy.    ASSESSMENT/PLAN    Diagnoses and all orders for this visit:    1. HAWA (obstructive sleep apnea) (Primary)  -     PAP Therapy        1. Counseled patient regarding multimodal approach with healthy nutrition, healthy sleep, regular physical activity, social activities, counseling, and medications. Encouraged to practice lateral sleep position. Avoid alcohol and sedatives close to bedtime.  2. Refill supplies x1 year.  Return to clinic 1 year or sooner symptoms warrant.      I have reviewed the results of my evaluation and impression and discussed my recommendations in detail with the patient.      Signed by  MAGALIE Castaneda    November 8, 2022      CC: Fernando Booth MD         No ref. provider found      "

## 2023-02-13 DIAGNOSIS — E78.2 MIXED HYPERLIPIDEMIA: Primary | ICD-10-CM

## 2023-02-13 RX ORDER — ATORVASTATIN CALCIUM 20 MG/1
20 TABLET, FILM COATED ORAL DAILY
Qty: 90 TABLET | Refills: 3 | Status: SHIPPED | OUTPATIENT
Start: 2023-02-13

## 2023-05-05 ENCOUNTER — TELEPHONE (OUTPATIENT)
Dept: SLEEP MEDICINE | Facility: HOSPITAL | Age: 58
End: 2023-05-05
Payer: COMMERCIAL

## 2023-05-05 NOTE — TELEPHONE ENCOUNTER
PATIENT CALLED REQUESTING ASSISTANCE WITH GETTING A SCRIPT TO [employer] FOR HIS RECALLED MACHINE.    PATIENT PROVIDED NEEDED INFO,  I CALL [employer] AND REQUESTED THAT THEY FAX SCRIPT TO BE SIGNED FOR THE REPLACEMENT MACHINE.    [employer] CONFIRMED THAT FAX WOULD BE SEND WITHIN 48 HRS.      I RETURNED CALL TO PT TO LET HIM KNOW THAT WE WERE IN CONTACT WITH [employer] AND THATTHE REQUESTED FAX WAS BEING SENT TO PROVIDER TO SIGN.

## 2023-06-07 ENCOUNTER — OFFICE VISIT (OUTPATIENT)
Dept: INTERNAL MEDICINE | Facility: CLINIC | Age: 58
End: 2023-06-07
Payer: COMMERCIAL

## 2023-06-07 VITALS
WEIGHT: 197 LBS | SYSTOLIC BLOOD PRESSURE: 108 MMHG | RESPIRATION RATE: 16 BRPM | TEMPERATURE: 98 F | HEART RATE: 56 BPM | BODY MASS INDEX: 30.92 KG/M2 | HEIGHT: 67 IN | DIASTOLIC BLOOD PRESSURE: 62 MMHG

## 2023-06-07 DIAGNOSIS — E78.5 HYPERLIPIDEMIA, UNSPECIFIED HYPERLIPIDEMIA TYPE: ICD-10-CM

## 2023-06-07 DIAGNOSIS — K21.9 GASTROESOPHAGEAL REFLUX DISEASE WITHOUT ESOPHAGITIS: ICD-10-CM

## 2023-06-07 DIAGNOSIS — Z00.00 PHYSICAL EXAM: Primary | ICD-10-CM

## 2023-06-07 DIAGNOSIS — Z12.5 PROSTATE CANCER SCREENING: ICD-10-CM

## 2023-06-07 LAB
BASOPHILS # BLD AUTO: 0.09 10*3/MM3 (ref 0–0.2)
BASOPHILS NFR BLD AUTO: 1.3 % (ref 0–1.5)
BILIRUB BLD-MCNC: NEGATIVE MG/DL
CLARITY, POC: CLEAR
COLOR UR: YELLOW
DEPRECATED RDW RBC AUTO: 37.9 FL (ref 37–54)
EOSINOPHIL # BLD AUTO: 0.2 10*3/MM3 (ref 0–0.4)
EOSINOPHIL NFR BLD AUTO: 2.9 % (ref 0.3–6.2)
ERYTHROCYTE [DISTWIDTH] IN BLOOD BY AUTOMATED COUNT: 12.3 % (ref 12.3–15.4)
EXPIRATION DATE: NORMAL
GLUCOSE UR STRIP-MCNC: NEGATIVE MG/DL
HCT VFR BLD AUTO: 47 % (ref 37.5–51)
HGB BLD-MCNC: 16 G/DL (ref 13–17.7)
IMM GRANULOCYTES # BLD AUTO: 0.02 10*3/MM3 (ref 0–0.05)
IMM GRANULOCYTES NFR BLD AUTO: 0.3 % (ref 0–0.5)
KETONES UR QL: NEGATIVE
LEUKOCYTE EST, POC: NEGATIVE
LYMPHOCYTES # BLD AUTO: 1.65 10*3/MM3 (ref 0.7–3.1)
LYMPHOCYTES NFR BLD AUTO: 23.9 % (ref 19.6–45.3)
Lab: NORMAL
MCH RBC QN AUTO: 29.1 PG (ref 26.6–33)
MCHC RBC AUTO-ENTMCNC: 34 G/DL (ref 31.5–35.7)
MCV RBC AUTO: 85.5 FL (ref 79–97)
MONOCYTES # BLD AUTO: 0.57 10*3/MM3 (ref 0.1–0.9)
MONOCYTES NFR BLD AUTO: 8.2 % (ref 5–12)
NEUTROPHILS NFR BLD AUTO: 4.38 10*3/MM3 (ref 1.7–7)
NEUTROPHILS NFR BLD AUTO: 63.4 % (ref 42.7–76)
NITRITE UR-MCNC: NEGATIVE MG/ML
NRBC BLD AUTO-RTO: 0 /100 WBC (ref 0–0.2)
PH UR: 7 [PH] (ref 5–8)
PLATELET # BLD AUTO: 216 10*3/MM3 (ref 140–450)
PMV BLD AUTO: 9.3 FL (ref 6–12)
PROT UR STRIP-MCNC: NEGATIVE MG/DL
RBC # BLD AUTO: 5.5 10*6/MM3 (ref 4.14–5.8)
RBC # UR STRIP: NEGATIVE /UL
SP GR UR: 1.02 (ref 1–1.03)
UROBILINOGEN UR QL: NORMAL
WBC NRBC COR # BLD: 6.91 10*3/MM3 (ref 3.4–10.8)

## 2023-06-07 PROCEDURE — 85025 COMPLETE CBC W/AUTO DIFF WBC: CPT | Performed by: INTERNAL MEDICINE

## 2023-06-07 PROCEDURE — G0103 PSA SCREENING: HCPCS | Performed by: INTERNAL MEDICINE

## 2023-06-07 PROCEDURE — 99396 PREV VISIT EST AGE 40-64: CPT | Performed by: INTERNAL MEDICINE

## 2023-06-07 PROCEDURE — 84443 ASSAY THYROID STIM HORMONE: CPT | Performed by: INTERNAL MEDICINE

## 2023-06-07 PROCEDURE — 80061 LIPID PANEL: CPT | Performed by: INTERNAL MEDICINE

## 2023-06-07 PROCEDURE — 80053 COMPREHEN METABOLIC PANEL: CPT | Performed by: INTERNAL MEDICINE

## 2023-06-07 NOTE — PROGRESS NOTES
"Chief Complaint   Patient presents with    Annual Exam       History of Present Illness      The patient presents for an established patient physical examination and health maintenance visit.    The patient presents for a follow-up related to hyperlipidemia. He is following a low fat diet. He reports that he is exercising. He is taking atorvastatin and simvastatin. The patient is taking his medication as prescribed. He reports no medication side effects, including muscle cramps, abdominal pain, headaches or weakness. He denies chest pain, shortness of breath, orthopnea, paroxysmal nocturnal dyspnea, dyspnea on exertion, edema, palpitations or syncope.    The patient presents for a follow-up related to GERD. The patient is on pantoprazole for his gastroesophageal reflux. The medication is taken on a regular basis and gives complete relief of the symptoms. He reports no abdominal pain, belching, diarrhea, dysphagia, early satiety, heartburn, hoarseness, nausea, odynophagia, rectal bleeding, vomiting or weight loss. The GERD has no known aggravating factors.    Medications      Current Outpatient Medications:     atorvastatin (LIPITOR) 20 MG tablet, Take 1 tablet by mouth Daily., Disp: 90 tablet, Rfl: 3    pantoprazole (PROTONIX) 40 MG EC tablet, TAKE 1 TABLET BY MOUTH ONCE DAILY (Patient taking differently: Daily As Needed.), Disp: 30 tablet, Rfl: 2     Allergies    No Known Allergies    Problem List    Patient Active Problem List   Diagnosis    Severe obstructive sleep apnea    Burst fracture of twelfth thoracic vertebra    status post nonsegmental pedicle screw fixation T11-L1 without fusion        Medications, Allergies, Problems List and Past History were reviewed and updated.    Physical Examination    /62 (BP Location: Left arm, Patient Position: Sitting, Cuff Size: Adult)   Pulse 56   Temp 98 °F (36.7 °C) (Infrared)   Resp 16   Ht 170.8 cm (67.25\")   Wt 89.4 kg (197 lb)   BMI 30.63 kg/m² "       HEENT: Head- Normocephalic Atraumatic. Facies- Within normal limits. Pinnas- Normal texture and shape bilaterally. Canals- Normal bilaterally. TMs- Normal bilaterally. Nares- Patent bilaterally. Nasal Septum- is normal. There is no tenderness to palpation over the frontal or maxillary sinuses. Lids- Normal bilaterally. Conjunctiva- Clear bilaterally. Sclera- Anicteric bilaterally. Oropharynx- Moist with no lesions. Tonsils- No enlargement, erythema or exudate.    Neck: Thyroid- non enlarged, symmetric and has no nodules. No bruits are detected. ROM- Normal Range of Motion with no rigidity.    Lungs: Auscultation- Clear to auscultation bilaterally. There are no retractions, clubbing or cyanosis. The Expiratory to Inspiratory ratio is equal.    Lymph Nodes: Cervical- no enlarged lymph nodes noted. Clavicular- no enlarged supraclavicular lymph nodes noted. Axillary- no enlarged axillary lymph nodes noted. Inguinal- no enlarged inguinal lymph nodes noted.    Cardiovascular: There are no carotid bruits. Heart- Normal Rate with Regular rhythm and no murmurs. There are no gallops. There are no rubs. In the lower extremities there is no edema. The upper extremities do not have edema.    Abdomen: Soft, benign, non-tender with no masses, hernias, organomegaly or scars.    Male Genitourinary: The penis is circumcised with testicles found in the scrotum bilaterally. Testicular Size is normal bilaterally. The penis has no anatomic abnormalities.    Rectal: reveals normal external sphincter tone with no external lesions.    Prostate: The prostate gland is symmetric and smooth with no nodularity.    Dermatologic: The patient has no worrisome or suspicious skin lesions noted.    Impression and Assessment    Normal Physical Examination.    Encounter for Screening for Malignant Neoplasm of the Prostate.    Hyperlipidemia.    Gastroesophageal Reflux Disease.    Plan    Gastroesophageal Reflux Disease Plan: The patient was  instructed to continue the current medications.    Hyperlipidemia Plan: The patient was instructed to exercise daily, eat a low fat diet and continue his medications.    Counseling was provided regarding: Adequate Aerobic Exercise, Dental Visits, Flossing Teeth, Heart Healthy Diet and Seat Belt Utilization.    The following was ordered for screening and health maintenance: PSA.       Immunizations Ordered and Administered: None.    Diagnoses and all orders for this visit:    1. Physical exam (Primary)  -     TSH; Future  -     POC Urinalysis Dipstick, Automated; Future    2. Hyperlipidemia, unspecified hyperlipidemia type  -     Comprehensive Metabolic Panel; Future  -     Lipid Panel; Future    3. Gastroesophageal reflux disease without esophagitis  -     CBC & Differential; Future    4. Prostate cancer screening  -     PSA Screen; Future        Return to Office    The patient was instructed to return for follow-up in 1 year. Next Visit: Physical Examination. The patient was instructed to return sooner if the condition changes, worsens, or does not resolve.

## 2023-06-08 LAB
ALBUMIN SERPL-MCNC: 4.6 G/DL (ref 3.5–5.2)
ALBUMIN/GLOB SERPL: 1.9 G/DL
ALP SERPL-CCNC: 47 U/L (ref 39–117)
ALT SERPL W P-5'-P-CCNC: 37 U/L (ref 1–41)
ANION GAP SERPL CALCULATED.3IONS-SCNC: 10.3 MMOL/L (ref 5–15)
AST SERPL-CCNC: 24 U/L (ref 1–40)
BILIRUB SERPL-MCNC: 0.5 MG/DL (ref 0–1.2)
BUN SERPL-MCNC: 20 MG/DL (ref 6–20)
BUN/CREAT SERPL: 14.6 (ref 7–25)
CALCIUM SPEC-SCNC: 9.6 MG/DL (ref 8.6–10.5)
CHLORIDE SERPL-SCNC: 104 MMOL/L (ref 98–107)
CHOLEST SERPL-MCNC: 129 MG/DL (ref 0–200)
CO2 SERPL-SCNC: 25.7 MMOL/L (ref 22–29)
CREAT SERPL-MCNC: 1.37 MG/DL (ref 0.76–1.27)
EGFRCR SERPLBLD CKD-EPI 2021: 60.5 ML/MIN/1.73
GLOBULIN UR ELPH-MCNC: 2.4 GM/DL
GLUCOSE SERPL-MCNC: 91 MG/DL (ref 65–99)
HDLC SERPL-MCNC: 48 MG/DL (ref 40–60)
LDLC SERPL CALC-MCNC: 63 MG/DL (ref 0–100)
LDLC/HDLC SERPL: 1.3 {RATIO}
POTASSIUM SERPL-SCNC: 3.8 MMOL/L (ref 3.5–5.2)
PROT SERPL-MCNC: 7 G/DL (ref 6–8.5)
PSA SERPL-MCNC: 0.51 NG/ML (ref 0–4)
SODIUM SERPL-SCNC: 140 MMOL/L (ref 136–145)
TRIGL SERPL-MCNC: 92 MG/DL (ref 0–150)
TSH SERPL DL<=0.05 MIU/L-ACNC: 1.94 UIU/ML (ref 0.27–4.2)
VLDLC SERPL-MCNC: 18 MG/DL (ref 5–40)

## 2023-11-07 ENCOUNTER — OFFICE VISIT (OUTPATIENT)
Dept: SLEEP MEDICINE | Facility: CLINIC | Age: 58
End: 2023-11-07
Payer: COMMERCIAL

## 2023-11-07 VITALS
HEART RATE: 80 BPM | TEMPERATURE: 97 F | SYSTOLIC BLOOD PRESSURE: 110 MMHG | DIASTOLIC BLOOD PRESSURE: 80 MMHG | WEIGHT: 208 LBS | HEIGHT: 67 IN | OXYGEN SATURATION: 98 % | BODY MASS INDEX: 32.65 KG/M2

## 2023-11-07 DIAGNOSIS — G47.33 OSA (OBSTRUCTIVE SLEEP APNEA): Primary | ICD-10-CM

## 2023-11-07 PROCEDURE — 99213 OFFICE O/P EST LOW 20 MIN: CPT | Performed by: NURSE PRACTITIONER

## 2023-11-07 NOTE — PROGRESS NOTES
Chief Complaint:   Chief Complaint   Patient presents with    Sleep Apnea       HPI:    Vickey Dunaway is a 57 y.o. male here for follow-up of apnea.  Patient was last seen 11/8/2022.  Patient continues to do well with CPAP therapy.  Patient has no snoring, no gasping, and no morning headaches.  He sleeps 7 hours nightly and feels rested upon awakening.  Patient goes to sleep easily and does get up x1.  Patient has an [place] score of 10/24.  We could not get his SIM card to low today it is the original card to the machine approximately 10 years old.  He continues to do well without concern or complaint and will continue therapy.        Current medications are:   Current Outpatient Medications:     atorvastatin (LIPITOR) 20 MG tablet, Take 1 tablet by mouth Daily., Disp: 90 tablet, Rfl: 3    pantoprazole (PROTONIX) 40 MG EC tablet, TAKE 1 TABLET BY MOUTH ONCE DAILY (Patient taking differently: Daily As Needed.), Disp: 30 tablet, Rfl: 2.      The patient's relevant past medical, surgical, family and social history were reviewed and updated in Epic as appropriate.       Review of Systems   Respiratory:  Positive for apnea.    Gastrointestinal:         Rare heartburn   Musculoskeletal:  Positive for back pain.   Psychiatric/Behavioral:  Positive for sleep disturbance.    All other systems reviewed and are negative.        Objective:    Physical Exam  Constitutional:       Appearance: Normal appearance.   HENT:      Head: Normocephalic and atraumatic.      Mouth/Throat:      Comments: Class 3 airway  Cardiovascular:      Rate and Rhythm: Normal rate and regular rhythm.   Pulmonary:      Effort: Pulmonary effort is normal.      Breath sounds: Normal breath sounds.   Skin:     General: Skin is warm and dry.   Neurological:      Mental Status: He is alert and oriented to person, place, and time.   Psychiatric:         Mood and Affect: Mood normal.         Behavior: Behavior normal.         Thought Content: Thought  content normal.         Judgment: Judgment normal.       CPAP Report  No download available today    The patient continues to use and benefit from CPAP therapy.    ASSESSMENT/PLAN    Diagnoses and all orders for this visit:    1. HAWA (obstructive sleep apnea) (Primary)  -     PAP Therapy        Counseled patient regarding multimodal approach with healthy nutrition, healthy sleep, regular physical activity, social activities, counseling, and medications. Encouraged to practice lateral sleep position. Avoid alcohol and sedatives close to bedtime.    Refill supplies x1 year.  Return to clinic 1 year sooner symptoms warrant.    I have reviewed the results of my evaluation and impression and discussed my recommendations in detail with the patient.      Signed by  Tere Chaparro, MAGALIE    November 7, 2023      CC: Fernando Booth MD         No ref. provider found

## 2024-02-27 RX ORDER — SODIUM, POTASSIUM,MAG SULFATES 17.5-3.13G
1 SOLUTION, RECONSTITUTED, ORAL ORAL TAKE AS DIRECTED
Qty: 354 ML | Refills: 0 | Status: SHIPPED | OUTPATIENT
Start: 2024-02-27

## 2024-03-05 ENCOUNTER — LAB REQUISITION (OUTPATIENT)
Dept: LAB | Facility: HOSPITAL | Age: 59
End: 2024-03-05
Payer: COMMERCIAL

## 2024-03-05 ENCOUNTER — OUTSIDE FACILITY SERVICE (OUTPATIENT)
Dept: GASTROENTEROLOGY | Facility: CLINIC | Age: 59
End: 2024-03-05
Payer: COMMERCIAL

## 2024-03-05 DIAGNOSIS — Z86.010 PERSONAL HISTORY OF COLONIC POLYPS: ICD-10-CM

## 2024-03-05 DIAGNOSIS — Z12.11 ENCOUNTER FOR SCREENING FOR MALIGNANT NEOPLASM OF COLON: ICD-10-CM

## 2024-03-05 DIAGNOSIS — D12.3 BENIGN NEOPLASM OF TRANSVERSE COLON: ICD-10-CM

## 2024-03-05 DIAGNOSIS — K57.30 DIVERTICULOSIS OF LARGE INTESTINE WITHOUT PERFORATION OR ABSCESS WITHOUT BLEEDING: ICD-10-CM

## 2024-03-05 DIAGNOSIS — K64.8 OTHER HEMORRHOIDS: ICD-10-CM

## 2024-03-05 PROCEDURE — 88305 TISSUE EXAM BY PATHOLOGIST: CPT | Performed by: INTERNAL MEDICINE

## 2024-03-27 DIAGNOSIS — E78.2 MIXED HYPERLIPIDEMIA: ICD-10-CM

## 2024-03-27 RX ORDER — ATORVASTATIN CALCIUM 20 MG/1
20 TABLET, FILM COATED ORAL DAILY
Qty: 90 TABLET | Refills: 1 | Status: SHIPPED | OUTPATIENT
Start: 2024-03-27

## 2024-03-27 NOTE — TELEPHONE ENCOUNTER
Caller: DunawayVickey ROSAURA    Relationship: Self    Best call back number: [phone]    Requested Prescriptions:   Requested Prescriptions     Pending Prescriptions Disp Refills    atorvastatin (LIPITOR) 20 MG tablet 90 tablet 3     Sig: Take 1 tablet by mouth Daily.        Pharmacy where request should be sent: [facility] - [facility] - [address] AT [address] TO [facility] - [phone]  - [phone] FX     Last office visit with prescribing clinician: 6/7/2023   Last telemedicine visit with prescribing clinician: Visit date not found   Next office visit with prescribing clinician: 6/10/2024     Additional details provided by patient: PATIENT IS OUT OF MEDICATION     Does the patient have less than a 3 day supply:  [x] Yes  [] No    Would you like a call back once the refill request has been completed: [] Yes [x] No    If the office needs to give you a call back, can they leave a voicemail: [] Yes [x] No    Jer Selby Rep   03/27/24 16:10 EDT

## 2024-06-10 ENCOUNTER — OFFICE VISIT (OUTPATIENT)
Dept: INTERNAL MEDICINE | Facility: CLINIC | Age: 59
End: 2024-06-10
Payer: COMMERCIAL

## 2024-06-10 VITALS
TEMPERATURE: 97.8 F | HEIGHT: 68 IN | WEIGHT: 208 LBS | SYSTOLIC BLOOD PRESSURE: 112 MMHG | BODY MASS INDEX: 31.52 KG/M2 | DIASTOLIC BLOOD PRESSURE: 68 MMHG | HEART RATE: 76 BPM | RESPIRATION RATE: 16 BRPM

## 2024-06-10 DIAGNOSIS — M25.551 RIGHT HIP PAIN: ICD-10-CM

## 2024-06-10 DIAGNOSIS — E78.2 MIXED HYPERLIPIDEMIA: ICD-10-CM

## 2024-06-10 DIAGNOSIS — Z13.6 SCREENING FOR CARDIOVASCULAR CONDITION: ICD-10-CM

## 2024-06-10 DIAGNOSIS — Z00.00 PHYSICAL EXAM: Primary | ICD-10-CM

## 2024-06-10 DIAGNOSIS — G89.29 CHRONIC PAIN OF RIGHT KNEE: ICD-10-CM

## 2024-06-10 DIAGNOSIS — Z12.5 SCREENING FOR PROSTATE CANCER: ICD-10-CM

## 2024-06-10 DIAGNOSIS — M25.561 CHRONIC PAIN OF RIGHT KNEE: ICD-10-CM

## 2024-06-10 LAB
ALBUMIN SERPL-MCNC: 4.4 G/DL (ref 3.5–5.2)
ALBUMIN/GLOB SERPL: 1.8 G/DL
ALP SERPL-CCNC: 54 U/L (ref 39–117)
ALT SERPL W P-5'-P-CCNC: 24 U/L (ref 1–41)
ANION GAP SERPL CALCULATED.3IONS-SCNC: 7 MMOL/L (ref 5–15)
AST SERPL-CCNC: 20 U/L (ref 1–40)
BILIRUB BLD-MCNC: NEGATIVE MG/DL
BILIRUB SERPL-MCNC: 0.5 MG/DL (ref 0–1.2)
BUN SERPL-MCNC: 18 MG/DL (ref 6–20)
BUN/CREAT SERPL: 13.5 (ref 7–25)
CALCIUM SPEC-SCNC: 9.2 MG/DL (ref 8.6–10.5)
CHLORIDE SERPL-SCNC: 105 MMOL/L (ref 98–107)
CHOLEST SERPL-MCNC: 121 MG/DL (ref 0–200)
CLARITY, POC: CLEAR
CO2 SERPL-SCNC: 29 MMOL/L (ref 22–29)
COLOR UR: YELLOW
CREAT SERPL-MCNC: 1.33 MG/DL (ref 0.76–1.27)
EGFRCR SERPLBLD CKD-EPI 2021: 62.3 ML/MIN/1.73
EXPIRATION DATE: NORMAL
GLOBULIN UR ELPH-MCNC: 2.4 GM/DL
GLUCOSE SERPL-MCNC: 94 MG/DL (ref 65–99)
GLUCOSE UR STRIP-MCNC: NEGATIVE MG/DL
HDLC SERPL-MCNC: 51 MG/DL (ref 40–60)
KETONES UR QL: NEGATIVE
LDLC SERPL CALC-MCNC: 51 MG/DL (ref 0–100)
LDLC/HDLC SERPL: 0.98 {RATIO}
LEUKOCYTE EST, POC: NEGATIVE
Lab: NORMAL
NITRITE UR-MCNC: NEGATIVE MG/ML
PH UR: 6 [PH] (ref 5–8)
POTASSIUM SERPL-SCNC: 4.5 MMOL/L (ref 3.5–5.2)
PROT SERPL-MCNC: 6.8 G/DL (ref 6–8.5)
PROT UR STRIP-MCNC: NEGATIVE MG/DL
PSA SERPL-MCNC: 0.69 NG/ML (ref 0–4)
RBC # UR STRIP: NEGATIVE /UL
SODIUM SERPL-SCNC: 141 MMOL/L (ref 136–145)
SP GR UR: 1.02 (ref 1–1.03)
TRIGL SERPL-MCNC: 99 MG/DL (ref 0–150)
TSH SERPL DL<=0.05 MIU/L-ACNC: 1.78 UIU/ML (ref 0.27–4.2)
UROBILINOGEN UR QL: NORMAL
VLDLC SERPL-MCNC: 19 MG/DL (ref 5–40)

## 2024-06-10 PROCEDURE — 80061 LIPID PANEL: CPT | Performed by: INTERNAL MEDICINE

## 2024-06-10 PROCEDURE — 99214 OFFICE O/P EST MOD 30 MIN: CPT | Performed by: INTERNAL MEDICINE

## 2024-06-10 PROCEDURE — 85025 COMPLETE CBC W/AUTO DIFF WBC: CPT | Performed by: INTERNAL MEDICINE

## 2024-06-10 PROCEDURE — 81003 URINALYSIS AUTO W/O SCOPE: CPT | Performed by: INTERNAL MEDICINE

## 2024-06-10 PROCEDURE — 36415 COLL VENOUS BLD VENIPUNCTURE: CPT | Performed by: INTERNAL MEDICINE

## 2024-06-10 PROCEDURE — 84443 ASSAY THYROID STIM HORMONE: CPT | Performed by: INTERNAL MEDICINE

## 2024-06-10 PROCEDURE — G0103 PSA SCREENING: HCPCS | Performed by: INTERNAL MEDICINE

## 2024-06-10 PROCEDURE — 80053 COMPREHEN METABOLIC PANEL: CPT | Performed by: INTERNAL MEDICINE

## 2024-06-10 PROCEDURE — 99396 PREV VISIT EST AGE 40-64: CPT | Performed by: INTERNAL MEDICINE

## 2024-06-10 NOTE — PROGRESS NOTES
"Chief Complaint   Patient presents with    Annual Exam       History of Present Illness      The patient presents for an established patient physical examination and health maintenance visit.    The patient presents for a follow-up related to hyperlipidemia. He is following a low fat diet. He reports that he is exercising. He is taking atorvastatin. The patient is taking his medication as prescribed. He reports no medication side effects, including muscle cramps, abdominal pain, headaches or weakness. He denies chest pain, shortness of breath, orthopnea, paroxysmal nocturnal dyspnea, dyspnea on exertion, edema, palpitations or syncope.    The patient presents with pain in the right hip of many months duration. There is no history of trauma. The patient has no joint swelling. There is no stiffness. The patient denies a history of overuse or repetitive motion with the affected joints.    The joint pain is aggravated by motion. The pain has no alleviating factors noted.    The patient presents with pain in the right knee of many months duration. There is no history of trauma. The patient has no joint swelling. There is no stiffness. The patient denies a history of overuse or repetitive motion with the affected joints.    Medications      Current Outpatient Medications:     atorvastatin (LIPITOR) 20 MG tablet, Take 1 tablet by mouth Daily., Disp: 90 tablet, Rfl: 1     Allergies    No Known Allergies    Problem List    Patient Active Problem List   Diagnosis    Severe obstructive sleep apnea    Burst fracture of twelfth thoracic vertebra    status post nonsegmental pedicle screw fixation T11-L1 without fusion        Medications, Allergies, Problems List and Past History were reviewed and updated.    Physical Examination    /68 (BP Location: Left arm, Patient Position: Sitting, Cuff Size: Adult)   Pulse 76   Temp 97.8 °F (36.6 °C) (Infrared)   Resp 16   Ht 171.5 cm (67.5\")   Wt 94.3 kg (208 lb)   BMI 32.10 " kg/m²       HEENT: Head- Normocephalic Atraumatic. Facies- Within normal limits. Pinnas- Normal texture and shape bilaterally. Canals- Normal bilaterally. TMs- Normal bilaterally. Nares- Patent bilaterally. Nasal Septum- is normal. There is no tenderness to palpation over the frontal or maxillary sinuses. Lids- Normal bilaterally. Conjunctiva- Clear bilaterally. Sclera- Anicteric bilaterally. Oropharynx- Moist with no lesions. Tonsils- No enlargement, erythema or exudate.    Neck: Thyroid- non enlarged, symmetric and has no nodules. No bruits are detected. ROM- Normal Range of Motion with no rigidity.    Lungs: Auscultation- Clear to auscultation bilaterally. There are no retractions, clubbing or cyanosis. The Expiratory to Inspiratory ratio is equal.    Lymph Nodes: Cervical- no enlarged lymph nodes noted. Clavicular- Deferred. Axillary- Deferred. Inguinal- Deferred.    Cardiovascular: There are no carotid bruits. Heart- Normal Rate with Regular rhythm and no murmurs. There are no gallops. There are no rubs. In the lower extremities there is no edema. The upper extremities do not have edema.    Abdomen: Soft, benign, non-tender with no masses, hernias, organomegaly or scars.    Male Genitourinary: The penis is circumcised with testicles found in the scrotum bilaterally. Testicular Size is normal bilaterally. The penis has no anatomic abnormalities.    Rectal: reveals normal external sphincter tone with no external lesions.    Prostate: The prostate gland is symmetric and smooth with no nodularity.    Hips: The right hip has normal arely landmarks and no noted atrophy. Right hip flexion is normal. Right hip extension is normal. Right Hip abduction is normal. There is no pain with forced abduction. Right hip adduction is normal. Right hip internal rotation is normal. Right hip external rotation is normal. There is no pain with straight leg raising on the right.    Knees: The right knee is symmetric with normal arely  landmarks. There is no tenderness to palpation of the right knee. The right patella tracks midline. There is no patellar pain with quadriceps contraction (Grind Test)on the right. There is no pain over the right prepatellar bursa. The right sided Abduction Stress Test for medial collateral ligaments is normal. The right sided Adduction Stress Test for lateral collateral ligaments is normal. The right sided Anterior Drawer Test is normal. The right sided Posterior Drawer Test is normal. Lachman Test is normal on the right. Ulises Test of the medial and lateral meniscus is normal on the right.    Dermatologic: The patient has no worrisome or suspicious skin lesions noted.    Impression and Assessment    Normal Physical Examination.    Encounter for Screening for Malignant Neoplasm of the Prostate.    Hyperlipidemia.    Right Hip Pain.    Right Knee Pain.    Plan    Hyperlipidemia Plan: The patient was instructed to exercise daily, eat a low fat diet and continue his medications.    Right Hip Pain Plan: Further plans will be made after the test results are received and reviewed.    Right Knee Pain Plan: Further plans will be made after the test results are received and reviewed.    Counseling was provided regarding: Adequate Aerobic Exercise, Dental Visits, Flossing Teeth, Heart Healthy Diet and Seat Belt Utilization.    The following was ordered for screening and health maintenance: Cardiac Calcium CT Scan, CBC w Automated Diff, PSA, TSH and U/A.       Immunizations Ordered and Administered: None.    Diagnoses and all orders for this visit:    1. Physical exam (Primary)  -     CBC & Differential; Future  -     TSH; Future  -     POC Urinalysis Dipstick, Automated; Future    2. Mixed hyperlipidemia  -     Comprehensive Metabolic Panel; Future  -     Lipid Panel; Future    3. Right hip pain  -     XR Hip With or Without Pelvis 2 - 3 View Right; Future    4. Chronic pain of right knee  -     XR Knee 3 View Right;  Future    5. Screening for prostate cancer  -     PSA Screen; Future    6. Screening for cardiovascular condition  -     CT Cardiac Calcium Score Without Dye; Future        Return to Office    The patient was instructed to return for follow-up in 6 months. The patient was instructed to return sooner if the condition changes, worsens, or does not resolve.

## 2024-06-11 LAB
BASOPHILS # BLD AUTO: 0.08 10*3/MM3 (ref 0–0.2)
BASOPHILS NFR BLD AUTO: 1.2 % (ref 0–1.5)
DEPRECATED RDW RBC AUTO: 36.5 FL (ref 37–54)
EOSINOPHIL # BLD AUTO: 0.08 10*3/MM3 (ref 0–0.4)
EOSINOPHIL NFR BLD AUTO: 1.2 % (ref 0.3–6.2)
ERYTHROCYTE [DISTWIDTH] IN BLOOD BY AUTOMATED COUNT: 11.9 % (ref 12.3–15.4)
HCT VFR BLD AUTO: 46.9 % (ref 37.5–51)
HGB BLD-MCNC: 15.8 G/DL (ref 13–17.7)
IMM GRANULOCYTES # BLD AUTO: 0.02 10*3/MM3 (ref 0–0.05)
IMM GRANULOCYTES NFR BLD AUTO: 0.3 % (ref 0–0.5)
LYMPHOCYTES # BLD AUTO: 1.49 10*3/MM3 (ref 0.7–3.1)
LYMPHOCYTES NFR BLD AUTO: 22.5 % (ref 19.6–45.3)
MCH RBC QN AUTO: 28.8 PG (ref 26.6–33)
MCHC RBC AUTO-ENTMCNC: 33.7 G/DL (ref 31.5–35.7)
MCV RBC AUTO: 85.6 FL (ref 79–97)
MONOCYTES # BLD AUTO: 0.57 10*3/MM3 (ref 0.1–0.9)
MONOCYTES NFR BLD AUTO: 8.6 % (ref 5–12)
NEUTROPHILS NFR BLD AUTO: 4.38 10*3/MM3 (ref 1.7–7)
NEUTROPHILS NFR BLD AUTO: 66.2 % (ref 42.7–76)
NRBC BLD AUTO-RTO: 0 /100 WBC (ref 0–0.2)
PLATELET # BLD AUTO: 240 10*3/MM3 (ref 140–450)
PMV BLD AUTO: 8.9 FL (ref 6–12)
RBC # BLD AUTO: 5.48 10*6/MM3 (ref 4.14–5.8)
WBC NRBC COR # BLD AUTO: 6.62 10*3/MM3 (ref 3.4–10.8)

## 2024-07-16 ENCOUNTER — HOSPITAL ENCOUNTER (OUTPATIENT)
Facility: HOSPITAL | Age: 59
Discharge: HOME | End: 2024-07-16
Admitting: INTERNAL MEDICINE

## 2024-07-16 DIAGNOSIS — Z13.6 SCREENING FOR CARDIOVASCULAR CONDITION: ICD-10-CM

## 2024-07-16 PROCEDURE — 75571 CT HRT W/O DYE W/CA TEST: CPT

## 2024-08-18 DIAGNOSIS — E78.2 MIXED HYPERLIPIDEMIA: ICD-10-CM

## 2024-08-19 RX ORDER — ATORVASTATIN CALCIUM 20 MG/1
20 TABLET, FILM COATED ORAL DAILY
Qty: 90 TABLET | Refills: 1 | Status: SHIPPED | OUTPATIENT
Start: 2024-08-19

## 2024-10-05 ENCOUNTER — FLU SHOT (OUTPATIENT)
Dept: INTERNAL MEDICINE | Facility: CLINIC | Age: 59
End: 2024-10-05
Payer: COMMERCIAL

## 2024-10-05 DIAGNOSIS — Z23 NEED FOR INFLUENZA VACCINATION: Primary | ICD-10-CM

## 2024-10-05 PROCEDURE — 90471 IMMUNIZATION ADMIN: CPT | Performed by: INTERNAL MEDICINE

## 2024-10-05 PROCEDURE — 90656 IIV3 VACC NO PRSV 0.5 ML IM: CPT | Performed by: INTERNAL MEDICINE

## 2024-11-07 ENCOUNTER — OFFICE VISIT (OUTPATIENT)
Dept: SLEEP MEDICINE | Facility: CLINIC | Age: 59
End: 2024-11-07
Payer: COMMERCIAL

## 2024-11-07 VITALS
WEIGHT: 216 LBS | BODY MASS INDEX: 32.74 KG/M2 | HEART RATE: 67 BPM | DIASTOLIC BLOOD PRESSURE: 64 MMHG | TEMPERATURE: 98 F | SYSTOLIC BLOOD PRESSURE: 100 MMHG | HEIGHT: 68 IN | OXYGEN SATURATION: 97 %

## 2024-11-07 DIAGNOSIS — G47.33 OSA (OBSTRUCTIVE SLEEP APNEA): Primary | ICD-10-CM

## 2024-11-07 PROCEDURE — 99213 OFFICE O/P EST LOW 20 MIN: CPT | Performed by: NURSE PRACTITIONER

## 2024-11-07 NOTE — PROGRESS NOTES
"Chief Complaint:   Chief Complaint   Patient presents with    Follow-up    Sleep Apnea       HPI:    Vickey Dunaway is a 58 y.o. male here for follow-up of sleep apnea.  Patient was last seen 11/7/2023.  Patient continues to do well with BiPAP therapy.  Patient sleeps 7 hours nightly and feels rested upon awakening.  Patient goes to sleep easily and does not get up during the night.  Patient has an [place] score of 11/24.  Patient does well with standard tubing nasal mask.  Patient has no concerns or complaints and will continue therapy.        Current medications are:   Current Outpatient Medications:     atorvastatin (LIPITOR) 20 MG tablet, TAKE 1 TABLET DAILY, Disp: 90 tablet, Rfl: 1.      The patient's relevant past medical, surgical, family and social history were reviewed and updated in Epic as appropriate.       Review of Systems   Respiratory:  Positive for apnea.    Psychiatric/Behavioral:  Positive for sleep disturbance.    All other systems reviewed and are negative.        Objective:  /64   Pulse 67   Temp 98 °F (36.7 °C)   Ht 171.5 cm (67.52\")   Wt 98 kg (216 lb)   SpO2 97%   BMI 33.31 kg/m²     Physical Exam  Constitutional:       Appearance: Normal appearance.   HENT:      Head: Normocephalic and atraumatic.      Mouth/Throat:      Comments: Mallampati 3 anatomy  Cardiovascular:      Rate and Rhythm: Regular rhythm. Bradycardia present.   Pulmonary:      Effort: Pulmonary effort is normal. No respiratory distress.   Skin:     General: Skin is warm and dry.   Neurological:      Mental Status: He is alert and oriented to person, place, and time.   Psychiatric:         Mood and Affect: Mood normal.         Behavior: Behavior normal.         Thought Content: Thought content normal.         Judgment: Judgment normal.         CPAP Report    75/90 days of use  Greater than 4-hour use 75.6  AHI of 6.6  EPAP 8  IPAP 17  The patient continues to use and benefit from CPAP " therapy.    ASSESSMENT/PLAN    Diagnoses and all orders for this visit:    1. HAWA (obstructive sleep apnea) (Primary)  -     PAP Therapy        Refill supplies x 1 year.  Return to clinic 1 year or sooner symptoms warrant.  Signed by  MAGALIE Castaneda    November 7, 2024      CC: Fernando Booth MD         No ref. provider found

## 2025-03-31 DIAGNOSIS — E78.2 MIXED HYPERLIPIDEMIA: ICD-10-CM

## 2025-03-31 RX ORDER — ATORVASTATIN CALCIUM 20 MG/1
20 TABLET, FILM COATED ORAL DAILY
Qty: 90 TABLET | Refills: 1 | Status: SHIPPED | OUTPATIENT
Start: 2025-03-31

## 2025-06-11 ENCOUNTER — OFFICE VISIT (OUTPATIENT)
Dept: INTERNAL MEDICINE | Facility: CLINIC | Age: 60
End: 2025-06-11
Payer: COMMERCIAL

## 2025-06-11 VITALS
HEIGHT: 68 IN | HEART RATE: 72 BPM | TEMPERATURE: 98.6 F | RESPIRATION RATE: 12 BRPM | WEIGHT: 210.4 LBS | DIASTOLIC BLOOD PRESSURE: 74 MMHG | BODY MASS INDEX: 31.89 KG/M2 | SYSTOLIC BLOOD PRESSURE: 126 MMHG

## 2025-06-11 DIAGNOSIS — Z87.891 PERSONAL HISTORY OF TOBACCO USE, PRESENTING HAZARDS TO HEALTH: ICD-10-CM

## 2025-06-11 DIAGNOSIS — Z13.6 SCREENING FOR CARDIOVASCULAR CONDITION: ICD-10-CM

## 2025-06-11 DIAGNOSIS — Z12.5 SCREENING FOR PROSTATE CANCER: ICD-10-CM

## 2025-06-11 DIAGNOSIS — Z87.891 HISTORY OF NICOTINE DEPENDENCE: ICD-10-CM

## 2025-06-11 DIAGNOSIS — Z00.00 PHYSICAL EXAM: Primary | ICD-10-CM

## 2025-06-11 LAB
BILIRUB BLD-MCNC: NEGATIVE MG/DL
CLARITY, POC: CLEAR
COLOR UR: YELLOW
EXPIRATION DATE: NORMAL
GLUCOSE UR STRIP-MCNC: NEGATIVE MG/DL
KETONES UR QL: NEGATIVE
LEUKOCYTE EST, POC: NEGATIVE
Lab: NORMAL
NITRITE UR-MCNC: NEGATIVE MG/ML
PH UR: 6.5 [PH] (ref 5–8)
PROT UR STRIP-MCNC: NEGATIVE MG/DL
RBC # UR STRIP: NEGATIVE /UL
SP GR UR: 1.01 (ref 1–1.03)
UROBILINOGEN UR QL: NORMAL

## 2025-06-11 PROCEDURE — 85025 COMPLETE CBC W/AUTO DIFF WBC: CPT | Performed by: INTERNAL MEDICINE

## 2025-06-11 PROCEDURE — G0103 PSA SCREENING: HCPCS | Performed by: INTERNAL MEDICINE

## 2025-06-11 PROCEDURE — 80061 LIPID PANEL: CPT | Performed by: INTERNAL MEDICINE

## 2025-06-11 PROCEDURE — 84443 ASSAY THYROID STIM HORMONE: CPT | Performed by: INTERNAL MEDICINE

## 2025-06-11 PROCEDURE — 99396 PREV VISIT EST AGE 40-64: CPT | Performed by: INTERNAL MEDICINE

## 2025-06-11 PROCEDURE — 80053 COMPREHEN METABOLIC PANEL: CPT | Performed by: INTERNAL MEDICINE

## 2025-06-11 NOTE — PROGRESS NOTES
"Chief Complaint   Patient presents with    Annual Exam       History of Present Illness      The patient presents for an established patient physical examination and health maintenance visit.    Medications      Current Outpatient Medications:     atorvastatin (LIPITOR) 20 MG tablet, TAKE 1 TABLET DAILY, Disp: 90 tablet, Rfl: 1     Allergies    No Known Allergies    Problem List    Patient Active Problem List   Diagnosis    Severe obstructive sleep apnea    Burst fracture of twelfth thoracic vertebra    status post nonsegmental pedicle screw fixation T11-L1 without fusion        Medications, Allergies, Problems List and Past History were reviewed and updated.    Physical Examination    /74 (BP Location: Left arm, Patient Position: Sitting, Cuff Size: Adult)   Pulse 72   Temp 98.6 °F (37 °C) (Infrared)   Resp 12   Ht 171.5 cm (67.5\")   Wt 95.4 kg (210 lb 6.4 oz)   BMI 32.47 kg/m²       HEENT: Head- Normocephalic Atraumatic. Facies- Within normal limits. Pinnas- Normal texture and shape bilaterally. Canals- Normal bilaterally. TMs- Normal bilaterally. Nares- Patent bilaterally. Nasal Septum- is normal. There is no tenderness to palpation over the frontal or maxillary sinuses. Lids- Normal bilaterally. Conjunctiva- Clear bilaterally. Sclera- Anicteric bilaterally. Oropharynx- Moist with no lesions. Tonsils- No enlargement, erythema or exudate.    Neck: Thyroid- non enlarged, symmetric and has no nodules. No bruits are detected. ROM- Normal Range of Motion with no rigidity.    Lungs: Auscultation- Clear to auscultation bilaterally. There are no retractions, clubbing or cyanosis. The Expiratory to Inspiratory ratio is equal.    Lymph Nodes: Cervical- no enlarged lymph nodes noted. Clavicular- Deferred. Axillary- Deferred. Inguinal- Deferred.    Cardiovascular: There are no carotid bruits. Heart- Normal Rate with Regular rhythm and no murmurs. There are no gallops. There are no rubs. In the lower extremities " there is no edema. The upper extremities do not have edema.      Abdomen: Soft, benign, non-tender with no masses, hernias, organomegaly or scars.    Male Genitourinary: The penis is circumcised with testicles found in the scrotum bilaterally. Testicular Size is normal bilaterally. The penis has no anatomic abnormalities.    Rectal: reveals normal external sphincter tone with no external lesions.    Prostate: The prostate gland is symmetric and smooth with no nodularity.    Dermatologic: The patient has no worrisome or suspicious skin lesions noted.    Impression and Assessment    Normal Physical Examination.    Encounter for Screening for Malignant Neoplasm of the Prostate.    Plan    Counseling was provided regarding: Adequate Aerobic Exercise, Dental Visits, Flossing Teeth, Heart Healthy Diet, Seat Belt Utilization, Toning Exercise and Weight Lose.    The following was ordered for screening and health maintenance: CBC w Automated Diff, CMP, Lipid Profile, Low Dose Lung Cancer Screening CT, PSA, TSH and U/A.       Immunizations Ordered and Administered: None.      Diagnoses and all orders for this visit:    1. Physical exam (Primary)  -     CBC & Differential; Future  -     Comprehensive Metabolic Panel; Future  -     TSH; Future  -     POC Urinalysis Dipstick, Automated; Future    2. Screening for cardiovascular condition  -     Lipid Panel; Future    3. Personal history of tobacco use, presenting hazards to health    4. History of nicotine dependence  -     CT Chest Low Dose WO; Future    5. Screening for prostate cancer  -     PSA Screen; Future          Return to Office    The patient was instructed to return for follow-up in 1 year. The patient was instructed to return sooner if the condition changes, worsens, or does not resolve.

## 2025-06-11 NOTE — PROGRESS NOTES
" Low-Dose Lung Cancer CT Screening Visit    CHIEF COMPLAINT:    Shared Decision Making  I am discussing tobacco cessation today with Vickey Dunaway    SMOKING HISTORY:     Social History     Tobacco Use   Smoking Status Former    Current packs/day: 0.00    Average packs/day: 1 pack/day for 27.0 years (27.0 ttl pk-yrs)    Types: Cigarettes, Electronic Cigarette    Start date: 1986    Quit date: 2021    Years since quittin.1    Passive exposure: Past   Smokeless Tobacco Never       SUBJECTIVE:     Vickey Dunaway is a former smoker quitting 4 years ago with a  47  pack year history.  he reports no use of alternate forms of tobacco, electronic cigarettes, marijuana or other substances.  Based on the recommendation of the [place] Preventive Services Task Force, this patient is at high risk for lung cancer and a low-dose CT screening scan is recommended.     The patient has had no hemoptysis, unintentional weight loss or increasing shortness of breath. The patient is asymptomatic and has no signs or symptoms of lung cancer.     Together we discussed the potential benefits and potential harms of being screened for lung cancer including the potential for follow up diagnostic testing, risk for over diagnosis, false positive rate and radiation exposure using the [facility] Lung Cancer Screening Shared Decision-Making Tool. A copy of this decision aid resource has been provided in the after visit summary.  We also reviewed the patient's smoking history and counseled him on the importance and health benefits of maintaining cigarette smoking abstinence.      OBJECTIVE:    /74 (BP Location: Left arm, Patient Position: Sitting, Cuff Size: Adult)   Pulse 72   Temp 98.6 °F (37 °C) (Infrared)   Resp 12   Ht 171.5 cm (67.5\")   Wt 95.4 kg (210 lb 6.4 oz)   BMI 32.47 kg/m²   General: no distress, alert and oriented  Chest: Lung sounds are clear to auscultation, no wheezes, rales or rhonchi, " with symmetric air entry. No respiratory distress  Cardiovascular: RRR with no murmur auscultated            Recommendations for continued lung cancer screening:      We discussed the NCCN guidelines for lung cancer screening and the patient verbalized understanding that annual screening is recommended until fifteen years beyond smoking as long as they have no other disease or comorbidity that would prevent them from receiving cancer treatments such as surgery should a lung cancer be detected.  After review of the NCCN guidelines and recommendations for ongoing screening, the patient verbalized understanding of recommendations for follow-up.  The patient has decided to proceed with a Low Dose Lung Cancer Screening CT today.       3minutes face-to-face spent counseling patient on the continued health benefits of having quit tobacco, maintaining smoking abstinence, smoking cessation strategies and resources, as well as the importance of adherence to annual lung cancer low-dose CT screening.

## 2025-06-12 LAB
ALBUMIN SERPL-MCNC: 4.3 G/DL (ref 3.5–5.2)
ALBUMIN/GLOB SERPL: 1.5 G/DL
ALP SERPL-CCNC: 58 U/L (ref 39–117)
ALT SERPL W P-5'-P-CCNC: 31 U/L (ref 1–41)
ANION GAP SERPL CALCULATED.3IONS-SCNC: 14.2 MMOL/L (ref 5–15)
AST SERPL-CCNC: 24 U/L (ref 1–40)
BASOPHILS # BLD AUTO: 0.09 10*3/MM3 (ref 0–0.2)
BASOPHILS NFR BLD AUTO: 1.5 % (ref 0–1.5)
BILIRUB SERPL-MCNC: 0.3 MG/DL (ref 0–1.2)
BUN SERPL-MCNC: 18 MG/DL (ref 6–20)
BUN/CREAT SERPL: 14.5 (ref 7–25)
CALCIUM SPEC-SCNC: 9.3 MG/DL (ref 8.6–10.5)
CHLORIDE SERPL-SCNC: 103 MMOL/L (ref 98–107)
CHOLEST SERPL-MCNC: 123 MG/DL (ref 0–200)
CO2 SERPL-SCNC: 25.8 MMOL/L (ref 22–29)
CREAT SERPL-MCNC: 1.24 MG/DL (ref 0.76–1.27)
DEPRECATED RDW RBC AUTO: 43 FL (ref 37–54)
EGFRCR SERPLBLD CKD-EPI 2021: 67.4 ML/MIN/1.73
EOSINOPHIL # BLD AUTO: 0.11 10*3/MM3 (ref 0–0.4)
EOSINOPHIL NFR BLD AUTO: 1.8 % (ref 0.3–6.2)
ERYTHROCYTE [DISTWIDTH] IN BLOOD BY AUTOMATED COUNT: 12.8 % (ref 12.3–15.4)
GLOBULIN UR ELPH-MCNC: 2.9 GM/DL
GLUCOSE SERPL-MCNC: 63 MG/DL (ref 65–99)
HCT VFR BLD AUTO: 49.4 % (ref 37.5–51)
HDLC SERPL-MCNC: 43 MG/DL (ref 40–60)
HGB BLD-MCNC: 15.6 G/DL (ref 13–17.7)
IMM GRANULOCYTES # BLD AUTO: 0.01 10*3/MM3 (ref 0–0.05)
IMM GRANULOCYTES NFR BLD AUTO: 0.2 % (ref 0–0.5)
LDLC SERPL CALC-MCNC: 59 MG/DL (ref 0–100)
LDLC/HDLC SERPL: 1.31 {RATIO}
LYMPHOCYTES # BLD AUTO: 1.54 10*3/MM3 (ref 0.7–3.1)
LYMPHOCYTES NFR BLD AUTO: 25.2 % (ref 19.6–45.3)
MCH RBC QN AUTO: 28.8 PG (ref 26.6–33)
MCHC RBC AUTO-ENTMCNC: 31.6 G/DL (ref 31.5–35.7)
MCV RBC AUTO: 91.1 FL (ref 79–97)
MONOCYTES # BLD AUTO: 0.71 10*3/MM3 (ref 0.1–0.9)
MONOCYTES NFR BLD AUTO: 11.6 % (ref 5–12)
NEUTROPHILS NFR BLD AUTO: 3.66 10*3/MM3 (ref 1.7–7)
NEUTROPHILS NFR BLD AUTO: 59.7 % (ref 42.7–76)
NRBC BLD AUTO-RTO: 0 /100 WBC (ref 0–0.2)
PLATELET # BLD AUTO: 220 10*3/MM3 (ref 140–450)
PMV BLD AUTO: 9.5 FL (ref 6–12)
POTASSIUM SERPL-SCNC: 3.9 MMOL/L (ref 3.5–5.2)
PROT SERPL-MCNC: 7.2 G/DL (ref 6–8.5)
PSA SERPL-MCNC: 0.52 NG/ML (ref 0–4)
RBC # BLD AUTO: 5.42 10*6/MM3 (ref 4.14–5.8)
SODIUM SERPL-SCNC: 143 MMOL/L (ref 136–145)
TRIGL SERPL-MCNC: 118 MG/DL (ref 0–150)
TSH SERPL DL<=0.05 MIU/L-ACNC: 2.22 UIU/ML (ref 0.27–4.2)
VLDLC SERPL-MCNC: 21 MG/DL (ref 5–40)
WBC NRBC COR # BLD AUTO: 6.12 10*3/MM3 (ref 3.4–10.8)

## 2025-07-18 ENCOUNTER — HOSPITAL ENCOUNTER (OUTPATIENT)
Dept: CT IMAGING | Facility: HOSPITAL | Age: 60
Discharge: HOME | End: 2025-07-18
Admitting: INTERNAL MEDICINE
Payer: COMMERCIAL

## 2025-07-18 DIAGNOSIS — Z87.891 HISTORY OF NICOTINE DEPENDENCE: ICD-10-CM

## 2025-07-18 PROCEDURE — 71271 CT THORAX LUNG CANCER SCR C-: CPT

## (undated) DEVICE — JACKSON-PRATT 100CC BULB RESERVOIR: Brand: CARDINAL HEALTH

## (undated) DEVICE — SUT VIC 0 CTD BR 18IN UNDYE VCP724D

## (undated) DEVICE — PK NEURO DISC 10

## (undated) DEVICE — CANNULA,ADULT,SOFT-TOUCH,7TUBE,SC: Brand: MEDLINE

## (undated) DEVICE — DISPOSABLE IRRIGATION BIPOLAR CORD, M1000 TYPE: Brand: KIRWAN

## (undated) DEVICE — TRAP,MUCUS SPECIMEN,40CC: Brand: MEDLINE

## (undated) DEVICE — ENCORE® LATEX MICRO SIZE 7, STERILE LATEX POWDER-FREE SURGICAL GLOVE: Brand: ENCORE

## (undated) DEVICE — COVADERM PLUS: Brand: DEROYAL

## (undated) DEVICE — SUT ETHLN 3/0 FS1 30IN 669H

## (undated) DEVICE — TOOL 14MH30 LEGEND 14CM 3MM: Brand: MIDAS REX ™

## (undated) DEVICE — JACKSON TABLE POSITIONER KIT: Brand: MEDLINE INDUSTRIES, INC.

## (undated) DEVICE — ANTIBACTERIAL UNDYED BRAIDED (POLYGLACTIN 910), SYNTHETIC ABSORBABLE SUTURE: Brand: COATED VICRYL

## (undated) DEVICE — 3M™ STERI-STRIP™ REINFORCED ADHESIVE SKIN CLOSURES, R1547, 1/2 IN X 4 IN (12 MM X 100 MM), 6 STRIPS/ENVELOPE: Brand: 3M™ STERI-STRIP™

## (undated) DEVICE — 3M™ MEDIPORE™ H SOFT CLOTH SURGICAL TAPE, 2863, 3 IN X 10 YD, 12/CASE: Brand: 3M™ MEDIPORE™

## (undated) DEVICE — SOL LR 1000ML

## (undated) DEVICE — IRRIGATOR BULB ASEPTO 60CC STRL

## (undated) DEVICE — SPHR MARKR STEALTH STATION

## (undated) DEVICE — JP PERF DRN SIL FLT 7MM FULL: Brand: CARDINAL HEALTH

## (undated) DEVICE — SHEET,DRAPE,40X58,STERILE: Brand: MEDLINE

## (undated) DEVICE — MEDI-VAC NON-CONDUCTIVE SUCTION TUBING: Brand: CARDINAL HEALTH

## (undated) DEVICE — ELECTRD BLD EXT EDGE/INSUL 1P 4IN

## (undated) DEVICE — GOWN,REINF,POLY,ECL,PP SLV,XXL: Brand: MEDLINE

## (undated) DEVICE — ADHS LIQ MASTISOL 2/3ML

## (undated) DEVICE — 3M™ STERI-DRAPE™ INSTRUMENT POUCH 1018: Brand: STERI-DRAPE™

## (undated) DEVICE — SNAP KOVER: Brand: UNBRANDED

## (undated) DEVICE — ACCY PA700 LUBRICANT DIFFUSER MR7 4 PACK: Brand: MIDAS REX

## (undated) DEVICE — DRAPE,TOP,102X53,STERILE: Brand: MEDLINE

## (undated) DEVICE — ENCORE® LATEX MICRO SIZE 7.5, STERILE LATEX POWDER-FREE SURGICAL GLOVE: Brand: ENCORE

## (undated) DEVICE — PACK,UNIVERSAL,NO GOWNS: Brand: MEDLINE

## (undated) DEVICE — CANN NASL CO2 DIVIDED A/

## (undated) DEVICE — AIRWY SZ11

## (undated) DEVICE — SUT VIC PLS CTD ANTIB BR 3/0 8/18IN 45CM

## (undated) DEVICE — KT DRN EVAC WND PVC PCH WTROC RND 10F400

## (undated) DEVICE — ENCORE® LATEX MICRO SIZE 6.5, STERILE LATEX POWDER-FREE SURGICAL GLOVE: Brand: ENCORE